# Patient Record
Sex: MALE | Race: BLACK OR AFRICAN AMERICAN | NOT HISPANIC OR LATINO | Employment: OTHER | URBAN - METROPOLITAN AREA
[De-identification: names, ages, dates, MRNs, and addresses within clinical notes are randomized per-mention and may not be internally consistent; named-entity substitution may affect disease eponyms.]

---

## 2020-11-29 ENCOUNTER — HOSPITAL ENCOUNTER (EMERGENCY)
Facility: HOSPITAL | Age: 70
Discharge: HOME | End: 2020-11-29
Attending: EMERGENCY MEDICINE
Payer: MEDICARE

## 2020-11-29 VITALS
WEIGHT: 176 LBS | TEMPERATURE: 98 F | DIASTOLIC BLOOD PRESSURE: 79 MMHG | SYSTOLIC BLOOD PRESSURE: 157 MMHG | RESPIRATION RATE: 18 BRPM | BODY MASS INDEX: 23.84 KG/M2 | OXYGEN SATURATION: 100 % | HEART RATE: 66 BPM | HEIGHT: 72 IN

## 2020-11-29 DIAGNOSIS — E04.1 THYROID NODULE: ICD-10-CM

## 2020-11-29 DIAGNOSIS — R09.A2 GLOBUS SENSATION: Primary | ICD-10-CM

## 2020-11-29 LAB
ALBUMIN SERPL BCP-MCNC: 3.6 G/DL (ref 3.5–5.2)
ALP SERPL-CCNC: 46 U/L (ref 55–135)
ALT SERPL W/O P-5'-P-CCNC: 32 U/L (ref 10–44)
ANION GAP SERPL CALC-SCNC: 11 MMOL/L (ref 8–16)
AST SERPL-CCNC: 24 U/L (ref 10–40)
BASOPHILS # BLD AUTO: 0.04 K/UL (ref 0–0.2)
BASOPHILS NFR BLD: 0.6 % (ref 0–1.9)
BILIRUB SERPL-MCNC: 0.3 MG/DL (ref 0.1–1)
BUN SERPL-MCNC: 14 MG/DL (ref 8–23)
CALCIUM SERPL-MCNC: 9 MG/DL (ref 8.7–10.5)
CHLORIDE SERPL-SCNC: 107 MMOL/L (ref 95–110)
CO2 SERPL-SCNC: 25 MMOL/L (ref 23–29)
CREAT SERPL-MCNC: 1 MG/DL (ref 0.5–1.4)
DIFFERENTIAL METHOD: ABNORMAL
EOSINOPHIL # BLD AUTO: 0.1 K/UL (ref 0–0.5)
EOSINOPHIL NFR BLD: 1.1 % (ref 0–8)
ERYTHROCYTE [DISTWIDTH] IN BLOOD BY AUTOMATED COUNT: 14 % (ref 11.5–14.5)
EST. GFR  (AFRICAN AMERICAN): >60 ML/MIN/1.73 M^2
EST. GFR  (NON AFRICAN AMERICAN): >60 ML/MIN/1.73 M^2
GLUCOSE SERPL-MCNC: 111 MG/DL (ref 70–110)
HCT VFR BLD AUTO: 32.7 % (ref 40–54)
HGB BLD-MCNC: 11.6 G/DL (ref 14–18)
IMM GRANULOCYTES # BLD AUTO: 0.03 K/UL (ref 0–0.04)
IMM GRANULOCYTES NFR BLD AUTO: 0.5 % (ref 0–0.5)
LYMPHOCYTES # BLD AUTO: 1.6 K/UL (ref 1–4.8)
LYMPHOCYTES NFR BLD: 24 % (ref 18–48)
MCH RBC QN AUTO: 29.9 PG (ref 27–31)
MCHC RBC AUTO-ENTMCNC: 35.5 G/DL (ref 32–36)
MCV RBC AUTO: 84 FL (ref 82–98)
MONOCYTES # BLD AUTO: 1 K/UL (ref 0.3–1)
MONOCYTES NFR BLD: 15.2 % (ref 4–15)
NEUTROPHILS # BLD AUTO: 3.9 K/UL (ref 1.8–7.7)
NEUTROPHILS NFR BLD: 58.6 % (ref 38–73)
NRBC BLD-RTO: 0 /100 WBC
PLATELET # BLD AUTO: 290 K/UL (ref 150–350)
PMV BLD AUTO: 9.6 FL (ref 9.2–12.9)
POTASSIUM SERPL-SCNC: 4.6 MMOL/L (ref 3.5–5.1)
PROT SERPL-MCNC: 6.3 G/DL (ref 6–8.4)
RBC # BLD AUTO: 3.88 M/UL (ref 4.6–6.2)
SODIUM SERPL-SCNC: 143 MMOL/L (ref 136–145)
WBC # BLD AUTO: 6.59 K/UL (ref 3.9–12.7)

## 2020-11-29 PROCEDURE — 80053 COMPREHEN METABOLIC PANEL: CPT

## 2020-11-29 PROCEDURE — 25500020 PHARM REV CODE 255: Performed by: EMERGENCY MEDICINE

## 2020-11-29 PROCEDURE — 96374 THER/PROPH/DIAG INJ IV PUSH: CPT | Mod: 59

## 2020-11-29 PROCEDURE — 85025 COMPLETE CBC W/AUTO DIFF WBC: CPT

## 2020-11-29 PROCEDURE — 99285 EMERGENCY DEPT VISIT HI MDM: CPT | Mod: 25

## 2020-11-29 PROCEDURE — 63600175 PHARM REV CODE 636 W HCPCS: Performed by: EMERGENCY MEDICINE

## 2020-11-29 RX ORDER — DIPHENHYDRAMINE HYDROCHLORIDE 50 MG/ML
50 INJECTION INTRAMUSCULAR; INTRAVENOUS
Status: DISCONTINUED | OUTPATIENT
Start: 2020-11-29 | End: 2020-11-29

## 2020-11-29 RX ORDER — DIPHENHYDRAMINE HYDROCHLORIDE 50 MG/ML
25 INJECTION INTRAMUSCULAR; INTRAVENOUS
Status: DISCONTINUED | OUTPATIENT
Start: 2020-11-29 | End: 2020-11-29 | Stop reason: HOSPADM

## 2020-11-29 RX ORDER — OMEPRAZOLE 20 MG/1
20 CAPSULE, DELAYED RELEASE ORAL DAILY
Qty: 30 CAPSULE | Refills: 0 | Status: SHIPPED | OUTPATIENT
Start: 2020-11-29 | End: 2020-12-29

## 2020-11-29 RX ADMIN — DIPHENHYDRAMINE HYDROCHLORIDE 25 MG: 50 INJECTION, SOLUTION INTRAMUSCULAR; INTRAVENOUS at 08:11

## 2020-11-29 RX ADMIN — PREDNISONE 50 MG: 10 TABLET ORAL at 08:11

## 2020-11-29 RX ADMIN — IOHEXOL 75 ML: 350 INJECTION, SOLUTION INTRAVENOUS at 08:11

## 2020-11-29 NOTE — ED PROVIDER NOTES
"Encounter Date: 11/29/2020    SCRIBE #1 NOTE: I, Christal Ruiz, am scribing for, and in the presence of,  Dr. Arreaga . I have scribed the entire note.       History     Chief Complaint   Patient presents with    Food Bolus     pt reports feeling like something in throat and difficulty swallowing that began today.  reports eating breakfast this AM and bread PTA.  tolerating secretions.  able to speak in full sentences without difficulty.       Patient is a 70 year-old male who presents to the Emergency Department today with complaints of food bolus. Patient reports since eating breakfast (eggs, sausage and biscuits) this morning at 11 AM he has felt like "something" has been stuck in his throat. The sensation has been constant. Pt denies any difficulty swallowing, tolerating his secretions,  SOB or chest pain. Pt attempted to relieve symptoms by drinking hot tea and water with no improvement. Pt denies any abdominal pain or back pain. Furthermore, he denies any previous history esophageal disorder.     The history is provided by the patient.     Review of patient's allergies indicates:   Allergen Reactions    Penicillins     Contrast media Hives and Itching     Past Medical History:   Diagnosis Date    Cancer     prostate    Diabetes mellitus     Hypertension      No past surgical history on file.  No family history on file.  Social History     Tobacco Use    Smoking status: Not on file   Substance Use Topics    Alcohol use: Not on file    Drug use: Not on file     Review of Systems   Constitutional: Negative for chills and fever.   HENT: Negative for sore throat and trouble swallowing.         + foreign body in throat    Respiratory: Negative for choking, shortness of breath, wheezing and stridor.    Cardiovascular: Negative for chest pain.   Gastrointestinal: Negative for constipation, diarrhea, nausea and vomiting.   Genitourinary: Negative for dysuria, frequency and urgency.   Musculoskeletal: " Negative for back pain.   Skin: Negative for rash and wound.   Neurological: Negative for weakness.   Hematological: Does not bruise/bleed easily.   Psychiatric/Behavioral: Negative for agitation, behavioral problems and confusion.       Physical Exam     Initial Vitals [11/29/20 1610]   BP Pulse Resp Temp SpO2   137/72 72 18 98 °F (36.7 °C) 99 %      MAP       --         Physical Exam    Nursing note and vitals reviewed.  Constitutional: He appears well-developed and well-nourished. He is not diaphoretic. No distress.   Well-appearing   No acute distress  Non toxic in appearance    HENT:   Head: Normocephalic and atraumatic.   Mouth/Throat: Oropharynx is clear and moist.   Eyes: EOM are normal. Pupils are equal, round, and reactive to light.   Neck: No tracheal deviation present.   Cardiovascular: Normal rate, regular rhythm, normal heart sounds and intact distal pulses.   Pulmonary/Chest: Breath sounds normal. No stridor. No respiratory distress. He has no wheezes. He has no rales.   Airway is patent   No stridor    Abdominal: Soft. He exhibits no distension and no mass. There is no abdominal tenderness.   Musculoskeletal: Normal range of motion. No edema.   Neurological: He is alert and oriented to person, place, and time. No cranial nerve deficit or sensory deficit.   Skin: Skin is warm and dry. Capillary refill takes less than 2 seconds. No rash noted.   Psychiatric: He has a normal mood and affect. His behavior is normal. Thought content normal.         ED Course   Procedures  Labs Reviewed   CBC W/ AUTO DIFFERENTIAL - Abnormal; Notable for the following components:       Result Value    RBC 3.88 (*)     Hemoglobin 11.6 (*)     Hematocrit 32.7 (*)     Mono % 15.2 (*)     All other components within normal limits   COMPREHENSIVE METABOLIC PANEL - Abnormal; Notable for the following components:    Glucose 111 (*)     Alkaline Phosphatase 46 (*)     All other components within normal limits          Imaging  Results           CT Soft Tissue Neck With Contrast (Final result)  Result time 11/29/20 21:12:04    Final result by Anselmo Jason MD (11/29/20 21:12:04)                 Impression:      No foreign body in the neck.  No abnormal air collections.    1.3 cm ill-defined right thyroid lobe nodule.  Outpatient follow-up with dedicated thyroid ultrasound is suggested.    This report was flagged in Epic as abnormal.      Electronically signed by: Anselmo Jason MD  Date:    11/29/2020  Time:    21:12             Narrative:    EXAMINATION:  CT SOFT TISSUE NECK WITH CONTRAST    CLINICAL HISTORY:  Foreign body ingestion;    TECHNIQUE:  Low dose axial images as well as sagittal and coronal reconstructions were performed from the skull base to the clavicles following the intravenous administration of 75 mL of Omnipaque 350.    COMPARISON:  Soft tissue x-ray of the neck dated 11/29/2020.    FINDINGS:  The visualized intracranial compartment is within normal limits.  There is no evidence of abnormal enhancement within the intracranial cavity.    The orbits and intraorbital contents are unremarkable.  The paranasal sinuses are unremarkable.  The mastoid air cells are clear.    The nasal cavity is within normal limits.  The oral cavity is within normal limits.  The oropharynx and nasopharynx are within normal limits.    The epiglottis is unremarkable.  The piriform sinuses are within normal limits.  The larynx is unremarkable.  The hypopharynx is unremarkable.  The esophagus is within normal limits.  No foreign body is identified within the esophagus.    The parotid glands are unremarkable.  The submandibular glands are within normal limits.  There is asymmetric enlargement of the right thyroid lobe with multiple nodules within the right thyroid lobe.  There is an ill-defined 1.3 cm nodule within the posterior aspect of the right thyroid lobe.    The trachea is unremarkable.  The visualized lung apices are within normal limits.  No  airspace opacity is identified.  There are no nodules identified.    There are multilevel degenerative changes in the cervical spine.  This is most significant at the C3-C4 level with suspected moderate narrowing the spinal canal.  There is no evidence of a fracture.    There is normal intravascular enhancement.                               X-Ray Neck Soft Tissue (Final result)  Result time 11/29/20 17:21:23    Final result by Kulwant Mills MD (11/29/20 17:21:23)                 Impression:      Spondylosis in the cervical spine.    No evidence of soft tissue gas swelling or foreign body in the region of the nasal, oral or hypopharynx or esophagus.      Electronically signed by: Kulwant Mills  Date:    11/29/2020  Time:    17:21             Narrative:    EXAMINATION:  XR NECK SOFT TISSUE    CLINICAL HISTORY:  Unspecified foreign body in esophagus causing other injury, initial encounter    TECHNIQUE:  AP and lateral soft tissue views the neck were performed.    COMPARISON:  None.    FINDINGS:  There is normal alignment of the cervical spine with mild spondylosis and disc space narrowing at C3-4.  There is no prevertebral soft tissue swelling or foreign body or gas.  Base of the tongue and airway appear unremarkable.                                 Medical Decision Making:   Independently Interpreted Test(s):   I have ordered and independently interpreted X-rays - see prior notes.  Clinical Tests:   Radiological Study: Ordered and Reviewed  ED Management:  - plain radiograph soft tissue neck unremarkable  - CT soft tissue neck negative for FB; esophagus is unremarkable; notable incidental finding of thyroid nodule  - discussed case with on-call GI who did not feel that pt required emergent intervention at this time  - discussed findings with patient who states he feels better; he is comfortable with plan for discharge at this time  - pt stable for discharge  - pt instructed to f/u with his OP for further  evaluation of thyroid nodule   - No further intervention is indicated at this time after having taken into account the patient's history, physical exam findings, and empirical and objective data obtained during the patient's emergency department workup.   - The patient is at low risk for an emergent medical condition at this time, and I am of the belief that that it is safe to discharge the patient from the emergency department.   - The patient is instructed to follow up as outpatient as indicated on the discharge paperwork.    - I have discussed the specifics of the workup with the patient and the patient has verbalized understanding of the details of the workup, the diagnosis, the treatment plan, and the need for outpatient follow-up.    - Although the patient has no emergent etiology today this does not preclude the development of an emergent condition so, in addition, I have advised the patient that they can return to the ED and/or activate EMS at any time with worsening of their symptoms, change of their symptoms, or with any other medical complaint.    - The patient remained comfortable and stable during their visit in the ED.    - Discharge and follow-up instructions discussed with the patient who expressed understanding and willingness to comply with my recommendations.  - Results of all emergency department tests  discussed thoroughly with patient; all patient questions answered; pt in agreement with plan  - Pt instructed to follow up with PCP in 2-3 days for recheck of today's complaints  - Pt given strict emergency department return precautions for any new or worsening of symptoms  - Pt discharged from the emergency department in stable condition, in no acute distress                     ED Course as of Dec 01 0254   Sun Nov 29, 2020 2125 Patient reports feeling better; patient does not currently describe a globus sensation when questioned.    [LC]   2131 Discussed case with on-call GI, Dr. Fisher, who  did not feel that the patient required emergent endoscopy at this time.     [LC]      ED Course User Index  [LC] Migel Arreaga MD            Clinical Impression:     ICD-10-CM ICD-9-CM   1. Globus sensation  R09.89 306.4   2. Thyroid nodule  E04.1 241.0                          ED Disposition Condition    Discharge Stable        ED Prescriptions     Medication Sig Dispense Start Date End Date Auth. Provider    omeprazole (PRILOSEC) 20 MG capsule Take 1 capsule (20 mg total) by mouth once daily. 30 capsule 11/29/2020 12/29/2020 Migel Arreaga MD        Follow-up Information     Follow up With Specialties Details Why Contact Info    Daniel Toscano MD Gastroenterology  Gastroenterology. Please schedule a follow up appointment. [address]  [address]  [place] [address]  [phone]      [facility]  If symptoms worsen [address] [address] [phone]                        I, Migel Arreaga,  personally performed the services described in this documentation. All medical record entries made by the scribe were at my direction and in my presence.  I have reviewed the chart and agree that the record reflects my personal performance and is accurate and complete. Migel Arreaga M.D. 2:54 AM12/01/2020                 Migel Arreaga MD  12/01/20 0254

## 2020-11-30 NOTE — ED NOTES
Pt requesting to talk with the doctor at this time. Pt reports he has questions about the diagnosis. md notified at this time.

## 2020-11-30 NOTE — DISCHARGE INSTRUCTIONS
Please follow up with your PCP in next 2-3 days for recheck of today's complaints.    Please return immediately for any new or worsening symptoms.

## 2020-11-30 NOTE — ED NOTES
upon carehandoff, pt vital signs are stable at this time. pt in no apparent distress. Pt reports feeling much better at this time. pt updated on results waiting at this time. pt verbalizes understanding. pt given call bell and instructed to call for assistance. pt verbalizes understanding and provides return demo. Will continue to monitor.

## 2020-11-30 NOTE — ED NOTES
Pt is in no apparent distress at this time. Pt verbalizes understanding of discharge and need to follow up with primary. Pt instructed to return to the ED if condition worsens. Pt verbalized understanding. Pt ambulatory out of unit, steady gait noted.       1/2 pack for 40 years/cigarettes

## 2020-11-30 NOTE — ED NOTES
Upon arrival to ct, pt reports that last time he received iv contrast he had an allergic reaction. Pt reports hives and itching. md reports to pre-treat pt prior to scan.

## 2021-07-14 ENCOUNTER — LAB VISIT (OUTPATIENT)
Dept: LAB | Facility: OTHER | Age: 71
End: 2021-07-14
Attending: INTERNAL MEDICINE
Payer: MEDICARE

## 2021-07-14 DIAGNOSIS — D64.9 ANEMIA, UNSPECIFIED: Primary | ICD-10-CM

## 2021-07-14 DIAGNOSIS — K92.2 ACUTE GASTROINTESTINAL HEMORRHAGE: ICD-10-CM

## 2021-07-14 LAB
BASOPHILS # BLD AUTO: 0.05 K/UL (ref 0–0.2)
BASOPHILS NFR BLD: 0.8 % (ref 0–1.9)
DIFFERENTIAL METHOD: ABNORMAL
EOSINOPHIL # BLD AUTO: 0 K/UL (ref 0–0.5)
EOSINOPHIL NFR BLD: 0.6 % (ref 0–8)
ERYTHROCYTE [DISTWIDTH] IN BLOOD BY AUTOMATED COUNT: 22.5 % (ref 11.5–14.5)
HCT VFR BLD AUTO: 32.9 % (ref 40–54)
HGB BLD-MCNC: 11.3 G/DL (ref 14–18)
IMM GRANULOCYTES # BLD AUTO: 0.02 K/UL (ref 0–0.04)
IMM GRANULOCYTES NFR BLD AUTO: 0.3 % (ref 0–0.5)
LYMPHOCYTES # BLD AUTO: 1.3 K/UL (ref 1–4.8)
LYMPHOCYTES NFR BLD: 20.3 % (ref 18–48)
MCH RBC QN AUTO: 24.8 PG (ref 27–31)
MCHC RBC AUTO-ENTMCNC: 34.3 G/DL (ref 32–36)
MCV RBC AUTO: 72 FL (ref 82–98)
MONOCYTES # BLD AUTO: 0.7 K/UL (ref 0.3–1)
MONOCYTES NFR BLD: 11.2 % (ref 4–15)
NEUTROPHILS # BLD AUTO: 4.1 K/UL (ref 1.8–7.7)
NEUTROPHILS NFR BLD: 66.8 % (ref 38–73)
NRBC BLD-RTO: 0 /100 WBC
PLATELET # BLD AUTO: 297 K/UL (ref 150–450)
PMV BLD AUTO: 8.6 FL (ref 9.2–12.9)
RBC # BLD AUTO: 4.55 M/UL (ref 4.6–6.2)
WBC # BLD AUTO: 6.17 K/UL (ref 3.9–12.7)

## 2021-07-14 PROCEDURE — 36415 COLL VENOUS BLD VENIPUNCTURE: CPT | Performed by: INTERNAL MEDICINE

## 2021-07-14 PROCEDURE — 85025 COMPLETE CBC W/AUTO DIFF WBC: CPT | Performed by: INTERNAL MEDICINE

## 2022-09-02 ENCOUNTER — HOSPITAL ENCOUNTER (EMERGENCY)
Facility: HOSPITAL | Age: 72
Discharge: HOME | End: 2022-09-02
Attending: EMERGENCY MEDICINE
Payer: MEDICARE

## 2022-09-02 VITALS
OXYGEN SATURATION: 100 % | TEMPERATURE: 99 F | HEIGHT: 72 IN | WEIGHT: 189 LBS | DIASTOLIC BLOOD PRESSURE: 93 MMHG | BODY MASS INDEX: 25.6 KG/M2 | HEART RATE: 67 BPM | SYSTOLIC BLOOD PRESSURE: 168 MMHG | RESPIRATION RATE: 18 BRPM

## 2022-09-02 DIAGNOSIS — R06.6 HICCUPS: Primary | ICD-10-CM

## 2022-09-02 DIAGNOSIS — R06.6 INTRACTABLE HICCUPS: ICD-10-CM

## 2022-09-02 LAB
ANION GAP SERPL CALC-SCNC: 8 MMOL/L (ref 8–16)
BASOPHILS # BLD AUTO: 0.05 K/UL (ref 0–0.2)
BASOPHILS NFR BLD: 0.8 % (ref 0–1.9)
BUN SERPL-MCNC: 16 MG/DL (ref 8–23)
CALCIUM SERPL-MCNC: 9.5 MG/DL (ref 8.7–10.5)
CHLORIDE SERPL-SCNC: 106 MMOL/L (ref 95–110)
CO2 SERPL-SCNC: 27 MMOL/L (ref 23–29)
CREAT SERPL-MCNC: 1.3 MG/DL (ref 0.5–1.4)
D DIMER PPP IA.FEU-MCNC: <0.19 MG/L FEU
DIFFERENTIAL METHOD: ABNORMAL
EOSINOPHIL # BLD AUTO: 0.1 K/UL (ref 0–0.5)
EOSINOPHIL NFR BLD: 1.9 % (ref 0–8)
ERYTHROCYTE [DISTWIDTH] IN BLOOD BY AUTOMATED COUNT: 13.1 % (ref 11.5–14.5)
EST. GFR  (NO RACE VARIABLE): 59 ML/MIN/1.73 M^2
GLUCOSE SERPL-MCNC: 116 MG/DL (ref 70–110)
HCT VFR BLD AUTO: 37.5 % (ref 40–54)
HGB BLD-MCNC: 13.6 G/DL (ref 14–18)
IMM GRANULOCYTES # BLD AUTO: 0.03 K/UL (ref 0–0.04)
IMM GRANULOCYTES NFR BLD AUTO: 0.5 % (ref 0–0.5)
LIPASE SERPL-CCNC: 85 U/L (ref 4–60)
LYMPHOCYTES # BLD AUTO: 1.1 K/UL (ref 1–4.8)
LYMPHOCYTES NFR BLD: 17.2 % (ref 18–48)
MCH RBC QN AUTO: 29.5 PG (ref 27–31)
MCHC RBC AUTO-ENTMCNC: 36.3 G/DL (ref 32–36)
MCV RBC AUTO: 81 FL (ref 82–98)
MONOCYTES # BLD AUTO: 1 K/UL (ref 0.3–1)
MONOCYTES NFR BLD: 15.3 % (ref 4–15)
NEUTROPHILS # BLD AUTO: 4.1 K/UL (ref 1.8–7.7)
NEUTROPHILS NFR BLD: 64.3 % (ref 38–73)
NRBC BLD-RTO: 0 /100 WBC
PLATELET # BLD AUTO: 242 K/UL (ref 150–450)
PMV BLD AUTO: 8.9 FL (ref 9.2–12.9)
POTASSIUM SERPL-SCNC: 4.9 MMOL/L (ref 3.5–5.1)
RBC # BLD AUTO: 4.61 M/UL (ref 4.6–6.2)
SODIUM SERPL-SCNC: 141 MMOL/L (ref 136–145)
TROPONIN I SERPL DL<=0.01 NG/ML-MCNC: <0.006 NG/ML (ref 0–0.03)
WBC # BLD AUTO: 6.33 K/UL (ref 3.9–12.7)

## 2022-09-02 PROCEDURE — 93010 EKG 12-LEAD: ICD-10-PCS | Mod: ,,, | Performed by: INTERNAL MEDICINE

## 2022-09-02 PROCEDURE — 96374 THER/PROPH/DIAG INJ IV PUSH: CPT

## 2022-09-02 PROCEDURE — 85025 COMPLETE CBC W/AUTO DIFF WBC: CPT | Performed by: PHYSICIAN ASSISTANT

## 2022-09-02 PROCEDURE — 83690 ASSAY OF LIPASE: CPT | Performed by: PHYSICIAN ASSISTANT

## 2022-09-02 PROCEDURE — 63600175 PHARM REV CODE 636 W HCPCS: Performed by: PHYSICIAN ASSISTANT

## 2022-09-02 PROCEDURE — 96361 HYDRATE IV INFUSION ADD-ON: CPT

## 2022-09-02 PROCEDURE — 93005 ELECTROCARDIOGRAM TRACING: CPT

## 2022-09-02 PROCEDURE — 93010 ELECTROCARDIOGRAM REPORT: CPT | Mod: ,,, | Performed by: INTERNAL MEDICINE

## 2022-09-02 PROCEDURE — 99285 EMERGENCY DEPT VISIT HI MDM: CPT | Mod: 25

## 2022-09-02 PROCEDURE — 85379 FIBRIN DEGRADATION QUANT: CPT | Performed by: EMERGENCY MEDICINE

## 2022-09-02 PROCEDURE — 25000003 PHARM REV CODE 250: Performed by: PHYSICIAN ASSISTANT

## 2022-09-02 PROCEDURE — 80048 BASIC METABOLIC PNL TOTAL CA: CPT | Performed by: PHYSICIAN ASSISTANT

## 2022-09-02 PROCEDURE — 84484 ASSAY OF TROPONIN QUANT: CPT | Performed by: EMERGENCY MEDICINE

## 2022-09-02 RX ORDER — METOCLOPRAMIDE HYDROCHLORIDE 5 MG/ML
10 INJECTION INTRAMUSCULAR; INTRAVENOUS
Status: COMPLETED | OUTPATIENT
Start: 2022-09-02 | End: 2022-09-02

## 2022-09-02 RX ORDER — CHLORPROMAZINE HYDROCHLORIDE 25 MG/1
25 TABLET, FILM COATED ORAL ONCE AS NEEDED
Qty: 4 TABLET | Refills: 0 | Status: SHIPPED | OUTPATIENT
Start: 2022-09-02 | End: 2022-09-02

## 2022-09-02 RX ORDER — LIDOCAINE HYDROCHLORIDE 20 MG/ML
10 SOLUTION OROPHARYNGEAL
Status: COMPLETED | OUTPATIENT
Start: 2022-09-02 | End: 2022-09-02

## 2022-09-02 RX ORDER — MAG HYDROX/ALUMINUM HYD/SIMETH 200-200-20
15 SUSPENSION, ORAL (FINAL DOSE FORM) ORAL
Status: COMPLETED | OUTPATIENT
Start: 2022-09-02 | End: 2022-09-02

## 2022-09-02 RX ADMIN — LIDOCAINE HYDROCHLORIDE 10 ML: 20 SOLUTION ORAL; TOPICAL at 05:09

## 2022-09-02 RX ADMIN — ALUMINUM HYDROXIDE, MAGNESIUM HYDROXIDE, AND DIMETHICONE 15 ML: 200; 20; 200 SUSPENSION ORAL at 05:09

## 2022-09-02 RX ADMIN — METOCLOPRAMIDE 10 MG: 5 INJECTION, SOLUTION INTRAMUSCULAR; INTRAVENOUS at 05:09

## 2022-09-02 NOTE — ED PROVIDER NOTES
SCRIBE #1 NOTE: IMaite, am scribing for, and in the presence of,  Amalia Weathers MD. I have scribed the following portions of the note - Other sections scribed: HPI, ROS, PE.         EM PHYSICIAN NOTE    HPI  This patient presents with a complaint of   Chief Complaint   Patient presents with    Hiccups     Pt c/o of hiccups x3 days. Pt stated they are constant and will not go away. Pt denied chest pain or SOB. Pt denied NVD.       HPI: Artie Mei is a 72 y.o. male, with a PMHx of DM, HTN, Cardiac Stents and long-term anticoagulant use, who presents to the ED with complaints of hiccups that have persisted for 3 days. Patient not hiccupping on exam but reports he was hiccupping for 1 hour on arrival but then stopped. Reports frequency of hiccups are every 20-30 seconds. Endorses sleep disturbances secondary to symptoms. Reports use of cough drops, but otherwise denies other forms of attempted Tx. Patient reports 1 similar episode but states it was years ago and resolved after visit to ED. Further reports CBG levels have been within range. Last bowel movement earlier today. No other modifying factors. Patient denies chest pain, SOB, N/V/D, fever, leg swelling, unexplained weight loss, or other associated symptoms.       REVIEW of PMH, SOC History and Family History:  Past Medical History:   Diagnosis Date    Cancer     prostate    Diabetes mellitus     Hypertension      Social history noncontributory  Family history noncontributory    Review of patient's allergies indicates:   Allergen Reactions    Penicillins     Contrast media Hives and Itching           REVIEW of SYSTEMS  Source: Patient  The nurse's notes and triage vital signs were reviewed.  GENERAL/CONSTITUTIONAL: There is no report of fever, fatigue, weakness, or unexplained weight loss.  CARDIOVASCULAR: There is no report of chest pain   RESPIRATORY: There is no report of cough or SOB  GASTROINTESTINAL: SEE HPI  MUSCULOSKELETAL: There is  no report of joint or muscle pain. No muscle weakness or tenderness.  SKIN AND BREASTS: There is no report of easy bruising, skin redness, skin rash.  HEMATOLOGIC/LYMPHATIC: SEE HPI  The remainder of the ROS is negative.        PHYSICAL EXAMINATION    ED Triage Vitals [09/02/22 1433]   Enc Vitals Group      /71      Pulse 71      Resp 18      Temp 98.1 °F (36.7 °C)      Temp src Oral      SpO2 100 %      Weight 189 lb      Height 6'      Head Circumference       Peak Flow       Pain Score       Pain Loc       Pain Edu?       Excl. in GC?      Vital signs and Pulse Ox reviewed in clinical context. Abnormalities noted: none:  Heart rate, blood pressure, temperature, respiratory rate and pulse ox are wnl  Pt's level of consciousness is alert, and the patient is in no distress.  Skin: warm, pink and dry. Capillary refill is less than 2 seconds.  Mucosa: moist  Head and Neck: WNL  Cardiac exam: RRR  Pulmonary exam: unlabored and clear  Abd Exam: soft nontender   Musculoskeletal: no joint tenderness, deformity or swelling   Neurologic: GCS: GCS 15; 5 over 5 strength, cranial nerves intact, neck supple       Initial Impression: hiccups  Plan: labs, xray, ekg  Micelle SASHA Weathers MD, 5:17 PM 9/2/2022      Medical decision making:   Nurses notes and Vital Signs reviewed.  Orders Placed This Encounter   Procedures    X-Ray Chest AP Portable    Basic metabolic panel    CBC Auto Differential    Lipase    Troponin I    D dimer, quantitative    Ambulatory referral/consult to Gastroenterology    EKG 12-lead       ED Course as of 09/02/22 2114   Fri Sep 02, 2022   1838 My independent interpretation of the EKG is normal sinus rhythm at a rate of 70.  There are flipped T-waves in the inferior and low lateral leads.  No ST segment elevation.  No old EKG in our system for comparison.  Epic everywhere shows that the patient has a history of an EKG that was recorded as nonspecific ST T wave abnormalities. []   1838 CMP is normal  [MH]   1839 CBC is normal [MH]   1839 Lipase is mildly elevated but not significant for pancreatitis [MH]   1839 Troponin is normal D-dimer is normal [MH]   2058 No recurrence of the hiccups [MH]      ED Course User Index  [] Amalia Weathers MD       Diagnoses that have been ruled out:   None   Diagnoses that are still under consideration:   None   Final diagnoses:   Intractable hiccups   Hiccups            Follow-up Information       Follow up With Specialties Details Why Contact Info    [facility]  In 2 days For Follow-up and Recheck Visit [email or web address] to schedule an appointment or have a same day ONLINE checkup.    Call for appointment  In 3 days For Follow-up and Recheck Call for Primary Care Clinic follow-up this week.  [phone]          ED Prescriptions       Medication Sig Dispense Start Date End Date Auth. Provider    chlorproMAZINE (THORAZINE) 25 MG tablet (Expires today) Take 1 tablet (25 mg total) by mouth once as needed (hiccups). 4 tablet 9/2/2022 9/2/2022 Amalia Weathers MD            This note was created using Dictation Software.  This program may occasionally misinterpret certain words and phrases.        Scribe Attestation:   Scribe #1: I performed the above scribed service and the documentation accurately describes the services I performed. I attest to the accuracy of the note.     SCRIBE ATTESTATION NOTE:   I attest that I personally performed the services documented by the scribe and acknowledged and confirm the content of the note.   Nurses notes were reviewed.  Amalia Weathers      Nurses notes were reviewed.      CRITICAL CARE TIME:  These services included the following: chart data review, reviewing nursing notes and researching old charts from internal and external sources, documentation time, consultant collaboration regarding findings and treatment options, medication orders and management, direct patient care, vital sign assessments, physical exam  reassessments, and ordering, interpreting and reviewing diagnostic studies/lab tests.    Aggregate critical care time was approximately 10 minutes, which includes only time during which I was engaged in work directly related to the patient's care, as described above, whether at the bedside or elsewhere in the Emergency Department.  It did not include time spent performing other reported procedures or the services of residents, students, nurses or physician assistants.         ED Disposition Condition    Discharge Stable                            Micelle SASHA Weathers MD  09/02/22 7366

## 2022-09-02 NOTE — ED NOTES
Patient reports infrequent hiccups that is currently not experiencing right now. Denies any n/v/d, SOB, or pain.

## 2022-09-03 NOTE — ED NOTES
Received report from  AJAY Webber. Introduced self at bedside, pt is resting comfortably, denies any pain at this time. Reports that hiccups are completely gone and havent returned in a while. Pt is alert, calm, and oriented x4, no acute distress noted.

## 2023-01-13 ENCOUNTER — HOSPITAL ENCOUNTER (OUTPATIENT)
Facility: HOSPITAL | Age: 73
Discharge: HOME | End: 2023-01-14
Attending: STUDENT IN AN ORGANIZED HEALTH CARE EDUCATION/TRAINING PROGRAM | Admitting: STUDENT IN AN ORGANIZED HEALTH CARE EDUCATION/TRAINING PROGRAM
Payer: MEDICARE

## 2023-01-13 DIAGNOSIS — Z95.5 HISTORY OF CORONARY ANGIOPLASTY WITH INSERTION OF STENT: Primary | ICD-10-CM

## 2023-01-13 DIAGNOSIS — R07.9 CHEST PAIN: ICD-10-CM

## 2023-01-13 DIAGNOSIS — E11.9 TYPE 2 DIABETES MELLITUS WITHOUT COMPLICATION, WITHOUT LONG-TERM CURRENT USE OF INSULIN: ICD-10-CM

## 2023-01-13 PROBLEM — E87.5 HYPERKALEMIA: Status: ACTIVE | Noted: 2023-01-13

## 2023-01-13 PROBLEM — E78.5 HLD (HYPERLIPIDEMIA): Status: ACTIVE | Noted: 2023-01-13

## 2023-01-13 PROBLEM — I10 HTN (HYPERTENSION): Status: ACTIVE | Noted: 2023-01-13

## 2023-01-13 PROBLEM — I25.10 CAD (CORONARY ARTERY DISEASE): Status: ACTIVE | Noted: 2023-01-13

## 2023-01-13 LAB
ALBUMIN SERPL BCP-MCNC: 4.1 G/DL (ref 3.5–5.2)
ALP SERPL-CCNC: 52 U/L (ref 55–135)
ALT SERPL W/O P-5'-P-CCNC: 37 U/L (ref 10–44)
ANION GAP SERPL CALC-SCNC: 8 MMOL/L (ref 8–16)
AST SERPL-CCNC: 30 U/L (ref 10–40)
BASOPHILS # BLD AUTO: 0.05 K/UL (ref 0–0.2)
BASOPHILS NFR BLD: 0.7 % (ref 0–1.9)
BILIRUB SERPL-MCNC: 0.3 MG/DL (ref 0.1–1)
BNP SERPL-MCNC: 23 PG/ML (ref 0–99)
BUN SERPL-MCNC: 21 MG/DL (ref 8–23)
CALCIUM SERPL-MCNC: 9.7 MG/DL (ref 8.7–10.5)
CHLORIDE SERPL-SCNC: 107 MMOL/L (ref 95–110)
CHOLEST SERPL-MCNC: 167 MG/DL (ref 120–199)
CHOLEST/HDLC SERPL: 2.2 {RATIO} (ref 2–5)
CO2 SERPL-SCNC: 24 MMOL/L (ref 23–29)
CREAT SERPL-MCNC: 1.3 MG/DL (ref 0.5–1.4)
DIFFERENTIAL METHOD: ABNORMAL
EOSINOPHIL # BLD AUTO: 0.1 K/UL (ref 0–0.5)
EOSINOPHIL NFR BLD: 1.6 % (ref 0–8)
ERYTHROCYTE [DISTWIDTH] IN BLOOD BY AUTOMATED COUNT: 13.6 % (ref 11.5–14.5)
EST. GFR  (NO RACE VARIABLE): 59 ML/MIN/1.73 M^2
GLUCOSE SERPL-MCNC: 119 MG/DL (ref 70–110)
HCT VFR BLD AUTO: 38.2 % (ref 40–54)
HDLC SERPL-MCNC: 76 MG/DL (ref 40–75)
HDLC SERPL: 45.5 % (ref 20–50)
HGB BLD-MCNC: 13.7 G/DL (ref 14–18)
IMM GRANULOCYTES # BLD AUTO: 0.03 K/UL (ref 0–0.04)
IMM GRANULOCYTES NFR BLD AUTO: 0.4 % (ref 0–0.5)
LDLC SERPL CALC-MCNC: 80.2 MG/DL (ref 63–159)
LYMPHOCYTES # BLD AUTO: 1.2 K/UL (ref 1–4.8)
LYMPHOCYTES NFR BLD: 15.9 % (ref 18–48)
MCH RBC QN AUTO: 29.7 PG (ref 27–31)
MCHC RBC AUTO-ENTMCNC: 35.9 G/DL (ref 32–36)
MCV RBC AUTO: 83 FL (ref 82–98)
MONOCYTES # BLD AUTO: 0.8 K/UL (ref 0.3–1)
MONOCYTES NFR BLD: 11.2 % (ref 4–15)
NEUTROPHILS # BLD AUTO: 5.1 K/UL (ref 1.8–7.7)
NEUTROPHILS NFR BLD: 70.2 % (ref 38–73)
NONHDLC SERPL-MCNC: 91 MG/DL
NRBC BLD-RTO: 0 /100 WBC
PLATELET # BLD AUTO: 263 K/UL (ref 150–450)
PMV BLD AUTO: 8.8 FL (ref 9.2–12.9)
POTASSIUM SERPL-SCNC: 5.3 MMOL/L (ref 3.5–5.1)
PROT SERPL-MCNC: 7.2 G/DL (ref 6–8.4)
RBC # BLD AUTO: 4.61 M/UL (ref 4.6–6.2)
SODIUM SERPL-SCNC: 139 MMOL/L (ref 136–145)
TRIGL SERPL-MCNC: 54 MG/DL (ref 30–150)
TROPONIN I SERPL DL<=0.01 NG/ML-MCNC: 0.01 NG/ML (ref 0–0.03)
TROPONIN I SERPL DL<=0.01 NG/ML-MCNC: <0.006 NG/ML (ref 0–0.03)
TSH SERPL DL<=0.005 MIU/L-ACNC: 0.47 UIU/ML (ref 0.4–4)
WBC # BLD AUTO: 7.31 K/UL (ref 3.9–12.7)

## 2023-01-13 PROCEDURE — G0378 HOSPITAL OBSERVATION PER HR: HCPCS

## 2023-01-13 PROCEDURE — 85025 COMPLETE CBC W/AUTO DIFF WBC: CPT | Performed by: STUDENT IN AN ORGANIZED HEALTH CARE EDUCATION/TRAINING PROGRAM

## 2023-01-13 PROCEDURE — 96360 HYDRATION IV INFUSION INIT: CPT

## 2023-01-13 PROCEDURE — 25000242 PHARM REV CODE 250 ALT 637 W/ HCPCS: Performed by: STUDENT IN AN ORGANIZED HEALTH CARE EDUCATION/TRAINING PROGRAM

## 2023-01-13 PROCEDURE — 63600175 PHARM REV CODE 636 W HCPCS

## 2023-01-13 PROCEDURE — 25000003 PHARM REV CODE 250

## 2023-01-13 PROCEDURE — 80053 COMPREHEN METABOLIC PANEL: CPT | Performed by: STUDENT IN AN ORGANIZED HEALTH CARE EDUCATION/TRAINING PROGRAM

## 2023-01-13 PROCEDURE — 80061 LIPID PANEL: CPT

## 2023-01-13 PROCEDURE — 83880 ASSAY OF NATRIURETIC PEPTIDE: CPT | Performed by: STUDENT IN AN ORGANIZED HEALTH CARE EDUCATION/TRAINING PROGRAM

## 2023-01-13 PROCEDURE — 93010 EKG 12-LEAD: ICD-10-PCS | Mod: ,,, | Performed by: INTERNAL MEDICINE

## 2023-01-13 PROCEDURE — 25000003 PHARM REV CODE 250: Performed by: STUDENT IN AN ORGANIZED HEALTH CARE EDUCATION/TRAINING PROGRAM

## 2023-01-13 PROCEDURE — 99285 EMERGENCY DEPT VISIT HI MDM: CPT | Mod: 25

## 2023-01-13 PROCEDURE — 84484 ASSAY OF TROPONIN QUANT: CPT | Performed by: STUDENT IN AN ORGANIZED HEALTH CARE EDUCATION/TRAINING PROGRAM

## 2023-01-13 PROCEDURE — 93010 ELECTROCARDIOGRAM REPORT: CPT | Mod: ,,, | Performed by: INTERNAL MEDICINE

## 2023-01-13 PROCEDURE — 84443 ASSAY THYROID STIM HORMONE: CPT

## 2023-01-13 PROCEDURE — 83036 HEMOGLOBIN GLYCOSYLATED A1C: CPT

## 2023-01-13 PROCEDURE — 96372 THER/PROPH/DIAG INJ SC/IM: CPT

## 2023-01-13 PROCEDURE — 93005 ELECTROCARDIOGRAM TRACING: CPT

## 2023-01-13 RX ORDER — NITROGLYCERIN 0.4 MG/1
0.4 TABLET SUBLINGUAL EVERY 5 MIN PRN
Status: DISCONTINUED | OUTPATIENT
Start: 2023-01-13 | End: 2023-01-13

## 2023-01-13 RX ORDER — NITROGLYCERIN 0.4 MG/1
0.4 TABLET SUBLINGUAL EVERY 5 MIN PRN
Status: DISCONTINUED | OUTPATIENT
Start: 2023-01-13 | End: 2023-01-14 | Stop reason: HOSPADM

## 2023-01-13 RX ORDER — POLYETHYLENE GLYCOL 3350 17 G/17G
17 POWDER, FOR SOLUTION ORAL DAILY
Status: DISCONTINUED | OUTPATIENT
Start: 2023-01-14 | End: 2023-01-14 | Stop reason: HOSPADM

## 2023-01-13 RX ORDER — NALOXONE HCL 0.4 MG/ML
0.02 VIAL (ML) INJECTION
Status: DISCONTINUED | OUTPATIENT
Start: 2023-01-13 | End: 2023-01-14 | Stop reason: HOSPADM

## 2023-01-13 RX ORDER — AMLODIPINE BESYLATE 5 MG/1
10 TABLET ORAL DAILY
Status: DISCONTINUED | OUTPATIENT
Start: 2023-01-14 | End: 2023-01-14 | Stop reason: HOSPADM

## 2023-01-13 RX ORDER — HYDRALAZINE HYDROCHLORIDE 20 MG/ML
10 INJECTION INTRAMUSCULAR; INTRAVENOUS EVERY 6 HOURS PRN
Status: DISCONTINUED | OUTPATIENT
Start: 2023-01-13 | End: 2023-01-14 | Stop reason: HOSPADM

## 2023-01-13 RX ORDER — GLUCAGON 1 MG
1 KIT INJECTION
Status: DISCONTINUED | OUTPATIENT
Start: 2023-01-13 | End: 2023-01-14 | Stop reason: HOSPADM

## 2023-01-13 RX ORDER — IBUPROFEN 200 MG
24 TABLET ORAL
Status: DISCONTINUED | OUTPATIENT
Start: 2023-01-13 | End: 2023-01-14 | Stop reason: HOSPADM

## 2023-01-13 RX ORDER — FLUOXETINE HYDROCHLORIDE 20 MG/1
20 CAPSULE ORAL DAILY
COMMUNITY

## 2023-01-13 RX ORDER — MAG HYDROX/ALUMINUM HYD/SIMETH 200-200-20
30 SUSPENSION, ORAL (FINAL DOSE FORM) ORAL 4 TIMES DAILY PRN
Status: DISCONTINUED | OUTPATIENT
Start: 2023-01-13 | End: 2023-01-14 | Stop reason: HOSPADM

## 2023-01-13 RX ORDER — RANOLAZINE 500 MG/1
500 TABLET, EXTENDED RELEASE ORAL 2 TIMES DAILY
COMMUNITY

## 2023-01-13 RX ORDER — LEVOCETIRIZINE DIHYDROCHLORIDE 5 MG/1
5 TABLET, FILM COATED ORAL NIGHTLY
COMMUNITY

## 2023-01-13 RX ORDER — AMLODIPINE AND BENAZEPRIL HYDROCHLORIDE 10; 20 MG/1; MG/1
1 CAPSULE ORAL DAILY
COMMUNITY

## 2023-01-13 RX ORDER — CARVEDILOL 12.5 MG/1
12.5 TABLET ORAL 2 TIMES DAILY WITH MEALS
COMMUNITY

## 2023-01-13 RX ORDER — IPRATROPIUM BROMIDE AND ALBUTEROL SULFATE 2.5; .5 MG/3ML; MG/3ML
3 SOLUTION RESPIRATORY (INHALATION) EVERY 4 HOURS PRN
Status: DISCONTINUED | OUTPATIENT
Start: 2023-01-13 | End: 2023-01-14 | Stop reason: HOSPADM

## 2023-01-13 RX ORDER — MEMANTINE HYDROCHLORIDE 5 MG/1
5 TABLET ORAL 2 TIMES DAILY
Status: DISCONTINUED | OUTPATIENT
Start: 2023-01-13 | End: 2023-01-14 | Stop reason: HOSPADM

## 2023-01-13 RX ORDER — ENOXAPARIN SODIUM 100 MG/ML
40 INJECTION SUBCUTANEOUS EVERY 24 HOURS
Status: DISCONTINUED | OUTPATIENT
Start: 2023-01-13 | End: 2023-01-14 | Stop reason: HOSPADM

## 2023-01-13 RX ORDER — RANOLAZINE 500 MG/1
500 TABLET, EXTENDED RELEASE ORAL 2 TIMES DAILY
Status: DISCONTINUED | OUTPATIENT
Start: 2023-01-13 | End: 2023-01-14 | Stop reason: HOSPADM

## 2023-01-13 RX ORDER — TALC
6 POWDER (GRAM) TOPICAL NIGHTLY PRN
Status: DISCONTINUED | OUTPATIENT
Start: 2023-01-13 | End: 2023-01-14 | Stop reason: HOSPADM

## 2023-01-13 RX ORDER — ATORVASTATIN CALCIUM 10 MG/1
20 TABLET, FILM COATED ORAL DAILY
Status: DISCONTINUED | OUTPATIENT
Start: 2023-01-14 | End: 2023-01-14 | Stop reason: HOSPADM

## 2023-01-13 RX ORDER — FLUOXETINE HYDROCHLORIDE 20 MG/1
20 CAPSULE ORAL DAILY
Status: DISCONTINUED | OUTPATIENT
Start: 2023-01-14 | End: 2023-01-14 | Stop reason: HOSPADM

## 2023-01-13 RX ORDER — NAPROXEN SODIUM 220 MG/1
81 TABLET, FILM COATED ORAL DAILY
Status: DISCONTINUED | OUTPATIENT
Start: 2023-01-14 | End: 2023-01-14 | Stop reason: HOSPADM

## 2023-01-13 RX ORDER — ONDANSETRON 8 MG/1
8 TABLET, ORALLY DISINTEGRATING ORAL EVERY 8 HOURS PRN
Status: DISCONTINUED | OUTPATIENT
Start: 2023-01-13 | End: 2023-01-14 | Stop reason: HOSPADM

## 2023-01-13 RX ORDER — CARVEDILOL 12.5 MG/1
12.5 TABLET ORAL 2 TIMES DAILY WITH MEALS
Status: DISCONTINUED | OUTPATIENT
Start: 2023-01-14 | End: 2023-01-14 | Stop reason: HOSPADM

## 2023-01-13 RX ORDER — PROCHLORPERAZINE EDISYLATE 5 MG/ML
5 INJECTION INTRAMUSCULAR; INTRAVENOUS EVERY 6 HOURS PRN
Status: DISCONTINUED | OUTPATIENT
Start: 2023-01-13 | End: 2023-01-14 | Stop reason: HOSPADM

## 2023-01-13 RX ORDER — ACETAMINOPHEN 325 MG/1
650 TABLET ORAL EVERY 4 HOURS PRN
Status: DISCONTINUED | OUTPATIENT
Start: 2023-01-13 | End: 2023-01-14 | Stop reason: HOSPADM

## 2023-01-13 RX ORDER — ASPIRIN 325 MG
325 TABLET ORAL
Status: COMPLETED | OUTPATIENT
Start: 2023-01-13 | End: 2023-01-13

## 2023-01-13 RX ORDER — ATORVASTATIN CALCIUM 20 MG/1
20 TABLET, FILM COATED ORAL DAILY
COMMUNITY

## 2023-01-13 RX ORDER — MEMANTINE HYDROCHLORIDE 5 MG/1
5 TABLET ORAL 2 TIMES DAILY
COMMUNITY

## 2023-01-13 RX ORDER — INSULIN ASPART 100 [IU]/ML
0-5 INJECTION, SOLUTION INTRAVENOUS; SUBCUTANEOUS
Status: DISCONTINUED | OUTPATIENT
Start: 2023-01-13 | End: 2023-01-14 | Stop reason: HOSPADM

## 2023-01-13 RX ORDER — IBUPROFEN 200 MG
16 TABLET ORAL
Status: DISCONTINUED | OUTPATIENT
Start: 2023-01-13 | End: 2023-01-14 | Stop reason: HOSPADM

## 2023-01-13 RX ADMIN — RANOLAZINE 500 MG: 500 TABLET, FILM COATED, EXTENDED RELEASE ORAL at 11:01

## 2023-01-13 RX ADMIN — SODIUM CHLORIDE 500 ML: 9 INJECTION, SOLUTION INTRAVENOUS at 11:01

## 2023-01-13 RX ADMIN — MEMANTINE 5 MG: 5 TABLET ORAL at 11:01

## 2023-01-13 RX ADMIN — ENOXAPARIN SODIUM 40 MG: 40 INJECTION SUBCUTANEOUS at 11:01

## 2023-01-13 RX ADMIN — NITROGLYCERIN 0.4 MG: 0.4 TABLET, ORALLY DISINTEGRATING SUBLINGUAL at 06:01

## 2023-01-13 RX ADMIN — ASPIRIN 325 MG ORAL TABLET 325 MG: 325 PILL ORAL at 05:01

## 2023-01-13 NOTE — ED PROVIDER NOTES
"Encounter Date: 1/13/2023       History     Chief Complaint   Patient presents with    Chest Pain     Pt to ED with complaints of non radiating mid sternal chest pain that is constant. Pt states it started approx 1 hr PTA. Denies medications PTA. States the pain feels like "grabbing." Reports cardiac hx. Denies SOB.      72-year-old male with history of CAD (s/p 7 stents), HTN and NIDDM presents with chest pain that began an hour prior to arrival.  Patient reports he was at the gym exercising when symptoms began in described as pressure-like and substernal.  Pain does not radiate in any direction and the onset was rated 4/10.  Denies any nausea or diaphoresis.  He reports resting with minimal improvement of pain to 2/10 so came to the emergency department for evaluation.  Denies any shortness of breath or lower extremity swelling.  Denies any cough or recent sick contacts.      Review of patient's allergies indicates:   Allergen Reactions    Penicillins     Contrast media Hives and Itching     Past Medical History:   Diagnosis Date    Cancer     prostate    Diabetes mellitus     Hypertension      History reviewed. No pertinent surgical history.  History reviewed. No pertinent family history.  Social History     Tobacco Use    Smoking status: Never    Smokeless tobacco: Never     Review of Systems   Constitutional:  Negative for chills and fever.   HENT:  Negative for congestion and rhinorrhea.    Eyes:  Negative for pain.   Respiratory:  Negative for cough and shortness of breath.    Cardiovascular:  Positive for chest pain. Negative for leg swelling.   Gastrointestinal:  Negative for abdominal pain, nausea and vomiting.   Endocrine: Negative for polyuria.   Genitourinary:  Negative for dysuria and hematuria.   Musculoskeletal:  Negative for gait problem and neck pain.   Skin:  Negative for rash.   Allergic/Immunologic: Negative for immunocompromised state.   Neurological:  Negative for dizziness, weakness and " headaches.     Physical Exam     Initial Vitals [01/13/23 1722]   BP Pulse Resp Temp SpO2   134/66 67 16 97.6 °F (36.4 °C) 98 %      MAP       --         Physical Exam    Constitutional: He appears well-developed and well-nourished. He is not diaphoretic. No distress.   HENT:   Head: Normocephalic and atraumatic.   Eyes: Conjunctivae and EOM are normal. Pupils are equal, round, and reactive to light.   Neck: No JVD present.   Normal range of motion.  Cardiovascular:  Normal rate and regular rhythm.           Pulses:       Radial pulses are 2+ on the right side and 2+ on the left side.        Posterior tibial pulses are 2+ on the right side and 2+ on the left side.   Pulmonary/Chest: Breath sounds normal. No respiratory distress.   Abdominal: Abdomen is soft. Bowel sounds are normal. He exhibits no distension. There is no abdominal tenderness. There is no rebound and no guarding.   Musculoskeletal:         General: No tenderness or edema. Normal range of motion.      Cervical back: Normal range of motion.     Lymphadenopathy:     He has no cervical adenopathy.   Neurological: He is alert and oriented to person, place, and time.   Skin: Skin is warm. Capillary refill takes less than 2 seconds.   Psychiatric: He has a normal mood and affect.       ED Course   Procedures  Labs Reviewed   CBC W/ AUTO DIFFERENTIAL - Abnormal; Notable for the following components:       Result Value    Hemoglobin 13.7 (*)     Hematocrit 38.2 (*)     MPV 8.8 (*)     Lymph % 15.9 (*)     All other components within normal limits   COMPREHENSIVE METABOLIC PANEL - Abnormal; Notable for the following components:    Potassium 5.3 (*)     Glucose 119 (*)     Alkaline Phosphatase 52 (*)     eGFR 59 (*)     All other components within normal limits   TROPONIN I   TROPONIN I   B-TYPE NATRIURETIC PEPTIDE   TSH   LIPID PANEL   TSH   TROPONIN I     EKG Readings: (Independently Interpreted)   EKG obtained at 5:19 p.m. sinus rhythm with a ventricular  rate of 70, no STEMI, nonspecific T-wave abnormalities.  Intervals within normal limits.     Imaging Results              X-Ray Chest AP Portable (Final result)  Result time 01/13/23 18:50:58      Final result by Thai Mchugh MD (01/13/23 18:50:58)                   Impression:      No acute cardiopulmonary process identified.      Electronically signed by: Thai Mchugh MD  Date:    01/13/2023  Time:    18:50               Narrative:    EXAMINATION:  XR CHEST AP PORTABLE    CLINICAL HISTORY:  Chest Pain;    TECHNIQUE:  Single frontal view of the chest was performed.    COMPARISON:  September 2022.    FINDINGS:  Cardiac silhouette is normal in size.  Lungs are symmetrically expanded.  No evidence of focal consolidative process, pneumothorax, or significant pleural effusion.  No acute osseous abnormality identified.                                       Medications   nitroGLYCERIN SL tablet 0.4 mg (0.4 mg Sublingual Given 1/13/23 1800)   atorvastatin tablet 20 mg (has no administration in time range)   carvediloL tablet 12.5 mg (has no administration in time range)   FLUoxetine capsule 20 mg (has no administration in time range)   memantine tablet 5 mg (has no administration in time range)   ranolazine 12 hr tablet 500 mg (has no administration in time range)   amLODIPine tablet 10 mg (has no administration in time range)   albuterol-ipratropium 2.5 mg-0.5 mg/3 mL nebulizer solution 3 mL (has no administration in time range)   melatonin tablet 6 mg (has no administration in time range)   ondansetron disintegrating tablet 8 mg (has no administration in time range)   prochlorperazine injection Soln 5 mg (has no administration in time range)   polyethylene glycol packet 17 g (has no administration in time range)   aluminum-magnesium hydroxide-simethicone 200-200-20 mg/5 mL suspension 30 mL (has no administration in time range)   acetaminophen tablet 650 mg (has no administration in time range)   naloxone 0.4 mg/mL  injection 0.02 mg (has no administration in time range)   enoxaparin injection 40 mg (has no administration in time range)   hydrALAZINE injection 10 mg (has no administration in time range)   aspirin tablet 325 mg (325 mg Oral Given 1/13/23 1748)     Medical Decision Making:   Initial Assessment:   72 year old patient with hx of CAD (s/p 7 stents), NIDDM, and HTN presenting secondary to chest pain. No evidence of volume overload or shock on exam. EKG without evidence of STEMI. Patient is at higher risk of ACS due to risk factors and HPI with a heart score of 7.  Patient has received an aspirin. Troponins, Cxr, and labs ordered. Patient received Nitro for pain.     Also considered but less likely:     PE: normal rate, no sob/recent immobilization/surgery/travel/family history  Pneumonia: chest xray negative. No fever or leukocytosis. No cough and lungs non consistent with pna  Tamponade: unlikely due to chest xray and ekg  STEMI: No STEMI on ekg  Dissection: equal pulses bilaterally and no ripping chest pain to the back  Esophageal rupture: no dysphagia or vomiting and chest xray negative for mediastinal air  Arrhythmia: no arrhythmia on ekg  CHF: no fluid overload on Cxr and physical exam  Pneumothorax: bilateral breath sounds and no signs of pneumothorax on chest xray       Clinical Tests:   Lab Tests: Reviewed and Ordered  Radiological Study: Reviewed and Ordered  Medical Tests: Reviewed and Ordered  Additional MDM:   Heart Score:    History:          Highly suspicious.  ECG:             Nonspecific repolarisation disturbance  Age:               >65 years  Risk factors: >= 3 risk factors or history of atherosclerotic disease  Troponin:       Less than or equal to normal limit  Final Score: 7          ED Course as of 01/13/23 2208 Fri Jan 13, 2023   1830 CBC without significant leukocytosis, anemia, or platelet abnormalities.    [AS]   1853 K of 5.3 EKG without any changes Chem 14 negative for hypo-or hyper  natremia, chloridemia, or other electrolyte abnormalities; BUN and creatinine were within normal limits indicating normal kidney function, ALT and AST were within normal limits indicating normal liver function. Trop and BNP wnl    [AS]   2105 Repeat troponin of 0.01.  Given patient's heart score of 6 will admit patient in observation for cardiac workup. [AS]   2115 Discussed case with observation provider who agrees with admission for cardiac workup.  Spoke with patient about plan and is in agreement. [AS]      ED Course User Index  [AS] Yohana Jaime MD            This dictation has been generated using M-Modal Fluency Direct dictation; some phonetic errors may occur.          Clinical Impression:   Final diagnoses:  [R07.9] Chest pain        ED Disposition Condition    Observation                 Yohana Jaime MD  01/13/23 1038

## 2023-01-14 VITALS
TEMPERATURE: 98 F | WEIGHT: 185.63 LBS | RESPIRATION RATE: 18 BRPM | SYSTOLIC BLOOD PRESSURE: 143 MMHG | BODY MASS INDEX: 25.14 KG/M2 | DIASTOLIC BLOOD PRESSURE: 75 MMHG | OXYGEN SATURATION: 97 % | HEART RATE: 64 BPM | HEIGHT: 72 IN

## 2023-01-14 LAB
ANION GAP SERPL CALC-SCNC: 7 MMOL/L (ref 8–16)
AORTIC ROOT ANNULUS: 2.97 CM
AORTIC VALVE CUSP SEPERATION: 2.41 CM
ASCENDING AORTA: 2.99 CM
AV INDEX (PROSTH): 0.64
AV MEAN GRADIENT: 4 MMHG
AV PEAK GRADIENT: 8 MMHG
AV VALVE AREA: 2.13 CM2
AV VELOCITY RATIO: 0.71
BSA FOR ECHO PROCEDURE: 2.08 M2
BUN SERPL-MCNC: 16 MG/DL (ref 8–23)
CALCIUM SERPL-MCNC: 9.3 MG/DL (ref 8.7–10.5)
CHLORIDE SERPL-SCNC: 110 MMOL/L (ref 95–110)
CO2 SERPL-SCNC: 23 MMOL/L (ref 23–29)
CREAT SERPL-MCNC: 1.1 MG/DL (ref 0.5–1.4)
CV ECHO LV RWT: 0.45 CM
DOP CALC AO PEAK VEL: 1.39 M/S
DOP CALC AO VTI: 34.2 CM
DOP CALC LVOT AREA: 3.3 CM2
DOP CALC LVOT DIAMETER: 2.06 CM
DOP CALC LVOT PEAK VEL: 0.98 M/S
DOP CALC LVOT STROKE VOLUME: 72.95 CM3
DOP CALCLVOT PEAK VEL VTI: 21.9 CM
E WAVE DECELERATION TIME: 248.36 MSEC
E/A RATIO: 0.79
E/E' RATIO: 6.67 M/S
ECHO LV POSTERIOR WALL: 1.1 CM (ref 0.6–1.1)
EJECTION FRACTION: 70 %
EST. GFR  (NO RACE VARIABLE): >60 ML/MIN/1.73 M^2
ESTIMATED AVG GLUCOSE: 128 MG/DL (ref 68–131)
FRACTIONAL SHORTENING: 30 % (ref 28–44)
GLUCOSE SERPL-MCNC: 105 MG/DL (ref 70–110)
HBA1C MFR BLD: 6.1 % (ref 4–5.6)
INTERVENTRICULAR SEPTUM: 1.65 CM (ref 0.6–1.1)
IVC DIAMETER: 0.92 CM
LA MAJOR: 5.11 CM
LA MINOR: 5.35 CM
LA WIDTH: 3.9 CM
LEFT ATRIUM SIZE: 3.32 CM
LEFT ATRIUM VOLUME INDEX: 27.9 ML/M2
LEFT ATRIUM VOLUME: 57.53 CM3
LEFT INTERNAL DIMENSION IN SYSTOLE: 3.42 CM (ref 2.1–4)
LEFT VENTRICLE DIASTOLIC VOLUME INDEX: 54.66 ML/M2
LEFT VENTRICLE DIASTOLIC VOLUME: 112.6 ML
LEFT VENTRICLE MASS INDEX: 134 G/M2
LEFT VENTRICLE SYSTOLIC VOLUME INDEX: 23.3 ML/M2
LEFT VENTRICLE SYSTOLIC VOLUME: 48.08 ML
LEFT VENTRICULAR INTERNAL DIMENSION IN DIASTOLE: 4.9 CM (ref 3.5–6)
LEFT VENTRICULAR MASS: 275.2 G
LV LATERAL E/E' RATIO: 5.83 M/S
LV SEPTAL E/E' RATIO: 7.78 M/S
LVOT MG: 2.39 MMHG
LVOT MV: 0.74 CM/S
MV PEAK A VEL: 0.89 M/S
MV PEAK E VEL: 0.7 M/S
MV STENOSIS PRESSURE HALF TIME: 69.65 MS
MV VALVE AREA P 1/2 METHOD: 3.16 CM2
PISA TR MAX VEL: 2.18 M/S
POCT GLUCOSE: 118 MG/DL (ref 70–110)
POCT GLUCOSE: 94 MG/DL (ref 70–110)
POTASSIUM SERPL-SCNC: 3.9 MMOL/L (ref 3.5–5.1)
PULM VEIN S/D RATIO: 1.9
PV PEAK D VEL: 0.29 M/S
PV PEAK S VEL: 0.55 M/S
PV PEAK VELOCITY: 0.93 CM/S
RA MAJOR: 4.56 CM
RA PRESSURE: 3 MMHG
RA WIDTH: 3.6 CM
RIGHT VENTRICULAR END-DIASTOLIC DIMENSION: 3.02 CM
SINUS: 3.37 CM
SODIUM SERPL-SCNC: 140 MMOL/L (ref 136–145)
STJ: 2.75 CM
TDI LATERAL: 0.12 M/S
TDI SEPTAL: 0.09 M/S
TDI: 0.11 M/S
TR MAX PG: 19 MMHG
TRICUSPID ANNULAR PLANE SYSTOLIC EXCURSION: 2.4 CM
TROPONIN I SERPL DL<=0.01 NG/ML-MCNC: <0.006 NG/ML (ref 0–0.03)
TROPONIN I SERPL DL<=0.01 NG/ML-MCNC: <0.006 NG/ML (ref 0–0.03)
TV REST PULMONARY ARTERY PRESSURE: 22 MMHG

## 2023-01-14 PROCEDURE — 80048 BASIC METABOLIC PNL TOTAL CA: CPT

## 2023-01-14 PROCEDURE — G0378 HOSPITAL OBSERVATION PER HR: HCPCS

## 2023-01-14 PROCEDURE — 84484 ASSAY OF TROPONIN QUANT: CPT

## 2023-01-14 PROCEDURE — 84484 ASSAY OF TROPONIN QUANT: CPT | Mod: 91

## 2023-01-14 PROCEDURE — 25000003 PHARM REV CODE 250

## 2023-01-14 RX ORDER — NITROGLYCERIN 0.4 MG/1
0.4 TABLET SUBLINGUAL EVERY 5 MIN PRN
Qty: 20 TABLET | Refills: 11 | Status: SHIPPED | OUTPATIENT
Start: 2023-01-14

## 2023-01-14 RX ADMIN — ASPIRIN 81 MG CHEWABLE TABLET 81 MG: 81 TABLET CHEWABLE at 08:01

## 2023-01-14 RX ADMIN — ATORVASTATIN CALCIUM 20 MG: 10 TABLET, FILM COATED ORAL at 08:01

## 2023-01-14 RX ADMIN — MEMANTINE 5 MG: 5 TABLET ORAL at 08:01

## 2023-01-14 RX ADMIN — FLUOXETINE 20 MG: 20 CAPSULE ORAL at 08:01

## 2023-01-14 RX ADMIN — CARVEDILOL 12.5 MG: 12.5 TABLET, FILM COATED ORAL at 07:01

## 2023-01-14 RX ADMIN — RANOLAZINE 500 MG: 500 TABLET, FILM COATED, EXTENDED RELEASE ORAL at 08:01

## 2023-01-14 RX ADMIN — AMLODIPINE BESYLATE 10 MG: 5 TABLET ORAL at 08:01

## 2023-01-14 NOTE — PLAN OF CARE
Nurse, Gemini notified via secure message that He is clear for DC from a CM standpoint    [facility] - Med Surg  Discharge Final Note    Primary Care Provider: Primary Doctor No    Expected Discharge Date: 1/14/2023    Final Discharge Note (most recent)       Final Note - 01/14/23 1211          Final Note    Assessment Type Final Discharge Note     Anticipated Discharge Disposition Home or Self Care     Hospital Resources/Appts/Education Provided --   Patient will have self schedule due to weekend DC                    Important Message from Medicare             Contact Info       No, Primary Doctor   Relationship: PCP - General        Next Steps: Follow up    Kp Kwong MD   Specialty: Internal Medicine    [address] [address]   Phone: [phone]       Next Steps: Schedule an appointment as soon as possible for a visit in 1 week(s)    Instructions: for Hospital Follow up

## 2023-01-14 NOTE — H&P
"[facility]  History & Physical    Patient Name: Rafael Mei  MRN: [id number]  Patient Class: OP- Observation  Admission Date: 1/13/2023  Attending Physician: Xavi Kasper MD   Primary Care Provider: Primary Doctor No         Patient information was obtained from patient, past medical records and ER records.     Subjective:     Principal Problem:Chest pain    Chief Complaint:   Chief Complaint   Patient presents with    Chest Pain     Pt to ED with complaints of non radiating mid sternal chest pain that is constant. Pt states it started approx 1 hr PTA. Denies medications PTA. States the pain feels like "grabbing." Reports cardiac hx. Denies SOB.         HPI: Rafael Mei 72 y.o. male with HTN, HLD, DM, CAD (with 7 stents) hospital with a chief complaint of chest pain, acute onset while working out at the gym, the patient stated that he wrote about 3 miles on a stationary bicycle, did squats, and then started running on the treadmill when he began having substernal pressure-like pain that did not radiate that he rated 4/10.  He then sat down and rested for about 15 minutes without pain relief, he denies any lightheadedness diaphoresis, nausea, shortness of breath, facial droop, slurred speech, or other associated problems.  Patient was given 2 doses of sublingual nitroglycerin in the ED with complete relief of symptoms.      In the ED patient was afebrile without leukocytosis, potassium 5.3, EGFR 59, glucose 119, alkaline phosphatase 52, HDL 76, BNP and troponin negative, CXR negative, EKG showed "Normal sinus rhythm Possible Anterior infarct ,age undetermined" without changes from previous, patient placed in OBS for ACS rule out.  TRANSTHORACIC ECHOCARDIOGRAM REPORT   Name:   RAFAEL         MRN#:        [id number]   Study Date:    3/30/2021 SUMMARY:    1. Hyperdynamic left ventricular systolic function. LVEF of 65 to 70%.    2. Mildly elevated pulmonary artery pressure.    3. " Normal right ventricular systolic function.    4. Normal central venous pressure.    5. Dilated aortic root (4.0 cm).       Past Medical History:   Diagnosis Date    Cancer     prostate    Diabetes mellitus     Hypertension        History reviewed. No pertinent surgical history.    Review of patient's allergies indicates:   Allergen Reactions    Penicillins     Contrast media Hives and Itching       No current facility-administered medications on file prior to encounter.     Current Outpatient Medications on File Prior to Encounter   Medication Sig    amlodipine-benazepril 10-20mg (LOTREL) 10-20 mg per capsule Take 1 capsule by mouth once daily.    atorvastatin (LIPITOR) 20 MG tablet Take 20 mg by mouth once daily.    carvediloL (COREG) 12.5 MG tablet Take 12.5 mg by mouth 2 (two) times daily with meals.    FLUoxetine 20 MG capsule Take 20 mg by mouth once daily.    levocetirizine (XYZAL) 5 MG tablet Take 5 mg by mouth every evening.    memantine (NAMENDA) 5 MG Tab Take 5 mg by mouth 2 (two) times daily.    ranolazine (RANEXA) 500 MG Tb12 Take 500 mg by mouth 2 (two) times daily.    chlorproMAZINE (THORAZINE) 25 MG tablet Take 1 tablet (25 mg total) by mouth once as needed (hiccups).    omeprazole (PRILOSEC) 20 MG capsule Take 1 capsule (20 mg total) by mouth once daily.     Family History    None       Tobacco Use    Smoking status: Never    Smokeless tobacco: Never   Substance and Sexual Activity    Alcohol use: Not on file    Drug use: Not on file    Sexual activity: Not on file     Review of Systems   Constitutional:  Negative for chills and fever.   HENT:  Negative for congestion and rhinorrhea.    Eyes:  Negative for photophobia and visual disturbance.   Respiratory:  Positive for chest tightness. Negative for cough and shortness of breath.    Cardiovascular:  Positive for chest pain. Negative for palpitations and leg swelling.   Gastrointestinal:  Negative for abdominal pain, diarrhea,  nausea and vomiting.   Genitourinary:  Negative for frequency, hematuria and urgency.   Skin:  Negative for pallor, rash and wound.   Neurological:  Negative for light-headedness and headaches.   Psychiatric/Behavioral:  Negative for confusion and decreased concentration.    Objective:     Vital Signs (Most Recent):  Temp: 98.5 °F (36.9 °C) (01/13/23 2132)  Pulse: 63 (01/13/23 2132)  Resp: 17 (01/13/23 2132)  BP: (!) 164/81 (01/13/23 2132)  SpO2: 100 % (01/13/23 2132)   Vital Signs (24h Range):  Temp:  [97.6 °F (36.4 °C)-98.5 °F (36.9 °C)] 98.5 °F (36.9 °C)  Pulse:  [58-68] 63  Resp:  [12-17] 17  SpO2:  [96 %-100 %] 100 %  BP: (110-177)/(56-89) 164/81     Weight: 84.8 kg (187 lb)  Body mass index is 25.36 kg/m².    Physical Exam  Vitals and nursing note reviewed.   Constitutional:       General: He is not in acute distress.     Appearance: He is well-developed.   HENT:      Head: Normocephalic and atraumatic.      Right Ear: External ear normal.      Left Ear: External ear normal.      Nose: Nose normal.   Eyes:      Conjunctiva/sclera: Conjunctivae normal.      Pupils: Pupils are equal, round, and reactive to light.   Cardiovascular:      Rate and Rhythm: Normal rate and regular rhythm.      Pulses: Normal pulses.      Heart sounds: No murmur heard.  Pulmonary:      Effort: Pulmonary effort is normal. No respiratory distress.      Breath sounds: Normal breath sounds. No wheezing or rales.   Abdominal:      General: Bowel sounds are normal. There is no distension.      Palpations: Abdomen is soft.      Tenderness: There is no abdominal tenderness.      Comments: No palpable hepatomegaly or splenomegaly   Musculoskeletal:         General: No tenderness. Normal range of motion.      Cervical back: Normal range of motion and neck supple.   Skin:     General: Skin is warm and dry.   Neurological:      Mental Status: He is alert and oriented to person, place, and time.   Psychiatric:         Thought Content: Thought  content normal.         CRANIAL NERVES     CN III, IV, VI   Pupils are equal, round, and reactive to light.     Significant Labs: All pertinent labs within the past 24 hours have been reviewed.  Recent Lab Results         01/13/23 2025 01/13/23  1743        Albumin   4.1       Alkaline Phosphatase   52       ALT   37       Anion Gap   8       AST   30       Baso #   0.05       Basophil %   0.7       BILIRUBIN TOTAL   0.3  Comment: For infants and newborns, interpretation of results should be based  on gestational age, weight and in agreement with clinical  observations.    Premature Infant recommended reference ranges:  Up to 24 hours.............<8.0 mg/dL  Up to 48 hours............<12.0 mg/dL  3-5 days..................<15.0 mg/dL  6-29 days.................<15.0 mg/dL         BNP   23  Comment: Values of less than 100 pg/ml are consistent with non-CHF populations.       BUN   21       Calcium   9.7       Chloride   107       Cholesterol 167  Comment: The National Cholesterol Education Program (NCEP) has set the  following guidelines (reference ranges) for Cholesterol:  Optimal.....................<200 mg/dL  Borderline High.............200-239 mg/dL  High........................> or = 240 mg/dL           CO2   24       Creatinine   1.3       Differential Method   Automated       eGFR   59       Eos #   0.1       Eosinophil %   1.6       Glucose   119       Gran # (ANC)   5.1       Gran %   70.2       HDL 76  Comment: The National Cholesterol Education Program (NCEP) has set the  following guidelines (reference values) for HDL Cholesterol:  Low...............<40 mg/dL  Optimal...........>60 mg/dL           HDL/Cholesterol Ratio 45.5         Hematocrit   38.2       Hemoglobin   13.7       Immature Grans (Abs)   0.03  Comment: Mild elevation in immature granulocytes is non specific and   can be seen in a variety of conditions including stress response,   acute inflammation, trauma and pregnancy. Correlation with  other   laboratory and clinical findings is essential.         Immature Granulocytes   0.4       LDL Cholesterol External 80.2  Comment: The National Cholesterol Education Program (NCEP) has set the  following guidelines (reference values) for LDL Cholesterol:  Optimal.......................<130 mg/dL  Borderline High...............130-159 mg/dL  High..........................160-189 mg/dL  Very High.....................>190 mg/dL           Lymph #   1.2       Lymph %   15.9       MCH   29.7       MCHC   35.9       MCV   83       Mono #   0.8       Mono %   11.2       MPV   8.8       Non-HDL Cholesterol 91  Comment: Risk category and Non-HDL cholesterol goals:  Coronary heart disease (CHD)or equivalent (10-year risk of CHD >20%):  Non-HDL cholesterol goal     <130 mg/dL  Two or more CHD risk factors and 10-year risk of CHD <= 20%:  Non-HDL cholesterol goal     <160 mg/dL  0 to 1 CHD risk factor:  Non-HDL cholesterol goal     <190 mg/dL           nRBC   0       Platelets   263       Potassium   5.3       PROTEIN TOTAL   7.2       RBC   4.61       RDW   13.6       Sodium   139       Total Cholesterol/HDL Ratio 2.2         Triglycerides 54  Comment: The National Cholesterol Education Program (NCEP) has set the  following guidelines (reference values) for triglycerides:  Normal......................<150 mg/dL  Borderline High.............150-199 mg/dL  High........................200-499 mg/dL           Troponin I 0.010  Comment: The reference interval for Troponin I represents the 99th percentile   cutoff   for our facility and is consistent with 3rd generation assay   performance.     <0.006  Comment: The reference interval for Troponin I represents the 99th percentile   cutoff   for our facility and is consistent with 3rd generation assay   performance.         WBC   7.31               Significant Imaging: I have reviewed all pertinent imaging results/findings within the past 24 hours.  Imaging Results               X-Ray Chest AP Portable (Final result)  Result time 01/13/23 18:50:58      Final result by Thai Mchugh MD (01/13/23 18:50:58)                   Impression:      No acute cardiopulmonary process identified.      Electronically signed by: Thai Mchugh MD  Date:    01/13/2023  Time:    18:50               Narrative:    EXAMINATION:  XR CHEST AP PORTABLE    CLINICAL HISTORY:  Chest Pain;    TECHNIQUE:  Single frontal view of the chest was performed.    COMPARISON:  September 2022.    FINDINGS:  Cardiac silhouette is normal in size.  Lungs are symmetrically expanded.  No evidence of focal consolidative process, pneumothorax, or significant pleural effusion.  No acute osseous abnormality identified.                                        Assessment/Plan:     * Chest pain  Substernal chest pain while working out at the gym, did not resolve with rest, significant heart history with 7 stents placed most recent echo as above, CXR negative, EKG NSR with possible age undetermined anterior infarct without changes from previous KELLY score 5.  cardiac monitoring  trend troponins, initial troponins negative  ASA and PRN NTG  2D echo pending  TSH and lipid panel    Hyperkalemia  Initial potassium 5.3, presently no EKG changes, no chest pain, no shortness of breath, continue treatments as above  IVS   AM BMP if still elevated consider 1 dose Lokelma    CAD (coronary artery disease)  Currently placed in OBS for ACS rule out, per chart review patient has history of 7 stents, most recent MISTY as above.    Type 2 diabetes mellitus without complication, without long-term current use of insulin    Antihyperglycemics (From admission, onward)    Start     Stop Route Frequency Ordered    01/13/23 2343  insulin aspart U-100 pen 0-5 Units         -- SubQ Before meals & nightly PRN 01/13/23 2243        Hold Oral hypoglycemics while patient is in the hospital.  Low SS, goal 140-180  Will Check A1C    HLD (hyperlipidemia)  Continue  statin    HTN (hypertension)  BP not well controlled in ED, continue home antihypertensives with p.r.n. hydralazine for SBP greater than 180      VTE Risk Mitigation (From admission, onward)         Ordered     enoxaparin injection 40 mg  Daily         01/13/23 2129     IP VTE HIGH RISK PATIENT  Once         01/13/23 2129     Place sequential compression device  Until discontinued         01/13/23 2129               As clarification, on 01/13/2023, patient should be admitted for hospital observation services under my care in collaboration with Xavi Kasper MD. Chi Mc, TEODORO Mc NP  Department of Hospital Medicine   [facility]

## 2023-01-14 NOTE — ASSESSMENT & PLAN NOTE
Initial potassium 5.3, presently no EKG changes, no chest pain, no shortness of breath, continue treatments as above  IVS   AM BMP if still elevated consider 1 dose Lokelma

## 2023-01-14 NOTE — NURSING
Patient arrived to floor via transporter tech from ED. Patient transferred to bed independently. AAOX4. Patient was oriented to room, information on communication board, and medication regimen. Bed low adequate lighting provided, side rails x2 up, call bell in reach. Admission assessment completed. Tele monitor on and monitor tech notified. Vitals per chart. Patient denied having any acute distress at this time. None observed.      [facility]  Nurses Note -- 4 Eyes      1/13/2023       Skin assessed on: Admit      [x] No Pressure Injuries Present    []Prevention Measures Documented    [] Yes LDA  for Pressure Injury Previously documented     [] Yes New Pressure Injury Discovered   [] LDA for New Pressure Injury Added      Attending RN:  Brea Petersen LPN     Second RN:  Clarissa CHAHAL

## 2023-01-14 NOTE — DISCHARGE INSTRUCTIONS
Sublingual Nitroglycerin for Chest Pain  Place 1 under tongue if you have Chest Pain every 5 minutes until pain resolves  if you take 3 and pain has not resolved, please come to the Emergency Room.  Follow up with PCP and Cardiology       Clive Desai MD  Internal Medicine Staff

## 2023-01-14 NOTE — PLAN OF CARE
[place] [facility]  Discharge Assessment    Primary Care Provider: Primary Doctor No     Discharge Assessment (most recent)       BRIEF DISCHARGE ASSESSMENT - 01/14/23 1207          Discharge Planning    Assessment Type Discharge Planning Brief Assessment     Resource/Environmental Concerns none     Support Systems Family members     Equipment Currently Used at Home none     Current Living Arrangements home     Patient/Family Anticipates Transition to home     Patient/Family Anticipated Services at Transition none     DME Needed Upon Discharge  none     Discharge Plan A Home

## 2023-01-14 NOTE — ASSESSMENT & PLAN NOTE
BP not well controlled in ED, continue home antihypertensives with p.r.n. hydralazine for SBP greater than 180

## 2023-01-14 NOTE — ASSESSMENT & PLAN NOTE
Substernal chest pain while working out at the gym, did not resolve with rest, significant heart history with 7 stents placed most recent echo as above, CXR negative, EKG NSR with possible age undetermined anterior infarct without changes from previous KELLY score 5.  cardiac monitoring  trend troponins, initial troponins negative  ASA and PRN NTG  2D echo pending  TSH and lipid panel

## 2023-01-14 NOTE — DISCHARGE SUMMARY
"[facility] Medicine  Discharge Summary      Patient Name: Rafael Mei  MRN: [id number]  GAYLE: [id number]  Patient Class: OP- Observation  Admission Date: 1/13/2023  Hospital Length of Stay: 0 days  Discharge Date and Time:  01/14/2023 9:25 AM  Attending Physician: Clive Desai MD   Discharging Provider: Clive Desai MD  Primary Care Provider: Primary Doctor No    Primary Care Team: Networked reference to record PCT     HPI:   Rafael Mei 72 y.o. male with HTN, HLD, DM, CAD (with 7 stents) hospital with a chief complaint of chest pain, acute onset while working out at the gym, the patient stated that he wrote about 3 miles on a stationary bicycle, did squats, and then started running on the treadmill when he began having substernal pressure-like pain that did not radiate that he rated 4/10.  He then sat down and rested for about 15 minutes without pain relief, he denies any lightheadedness diaphoresis, nausea, shortness of breath, facial droop, slurred speech, or other associated problems.  Patient was given 2 doses of sublingual nitroglycerin in the ED with complete relief of symptoms.      In the ED patient was afebrile without leukocytosis, potassium 5.3, EGFR 59, glucose 119, alkaline phosphatase 52, HDL 76, BNP and troponin negative, CXR negative, EKG showed "Normal sinus rhythm Possible Anterior infarct ,age undetermined" without changes from previous, patient placed in OBS for ACS rule out.  TRANSTHORACIC ECHOCARDIOGRAM REPORT   Name:   RAFAEL         MRN#:        [id number]   Study Date:    3/30/2021 SUMMARY:    1. Hyperdynamic left ventricular systolic function. LVEF of 65 to 70%.    2. Mildly elevated pulmonary artery pressure.    3. Normal right ventricular systolic function.    4. Normal central venous pressure.    5. Dilated aortic root (4.0 cm).       * No surgery found *      Hospital Course:   Trop trend all negative, CP resolved.  Nitroglycerin SL sent to his pharmacy with " instructions.  Red Flags given to return.   F/u PCP and Cards       Sublingual Nitroglycerin for Chest Pain  o Place 1 under tongue if you have Chest Pain every 5 minutes until pain resolves  o if you take 3 and pain has not resolved, please come to the Emergency Room.   Follow up with PCP and Cardiology       Clive Desai MD  Internal Medicine Staff    Goals of Care Treatment Preferences:  Code Status: Full Code      Consults:     No new Assessment & Plan notes have been filed under this hospital service since the last note was generated.  Service: Hospital Medicine    Final Active Diagnoses:    Diagnosis Date Noted POA    PRINCIPAL PROBLEM:  Chest pain [R07.9] 01/13/2023 Yes    HTN (hypertension) [I10] 01/13/2023 Yes    HLD (hyperlipidemia) [E78.5] 01/13/2023 Yes    Type 2 diabetes mellitus without complication, without long-term current use of insulin [E11.9] 01/13/2023 Yes    CAD (coronary artery disease) [I25.10] 01/13/2023 Yes    Hyperkalemia [E87.5] 01/13/2023 Yes      Problems Resolved During this Admission:       Discharged Condition: good    Disposition: home    Follow Up:    Patient Instructions:      Ambulatory referral/consult to Cardiology   Standing Status: Future   Referral Priority: Routine Referral Type: Consultation   Referral Reason: Specialty Services Required   Requested Specialty: Cardiology   Number of Visits Requested: 1     Ambulatory referral/consult to Internal Medicine   Standing Status: Future   Referral Priority: Routine Referral Type: Consultation   Referral Reason: Specialty Services Required   Requested Specialty: Internal Medicine   Number of Visits Requested: 1         Recent Results (from the past 100 hour(s))   CBC auto differential    Collection Time: 01/13/23  5:43 PM   Result Value Ref Range    WBC 7.31 3.90 - 12.70 K/uL    RBC 4.61 4.60 - 6.20 M/uL    Hemoglobin 13.7 (L) 14.0 - 18.0 g/dL    Hematocrit 38.2 (L) 40.0 - 54.0 %    MCV 83 82 - 98 fL    MCH 29.7 27.0 -  31.0 pg    MCHC 35.9 32.0 - 36.0 g/dL    RDW 13.6 11.5 - 14.5 %    Platelets 263 150 - 450 K/uL    MPV 8.8 (L) 9.2 - 12.9 fL    Immature Granulocytes 0.4 0.0 - 0.5 %    Gran # (ANC) 5.1 1.8 - 7.7 K/uL    Immature Grans (Abs) 0.03 0.00 - 0.04 K/uL    Lymph # 1.2 1.0 - 4.8 K/uL    Mono # 0.8 0.3 - 1.0 K/uL    Eos # 0.1 0.0 - 0.5 K/uL    Baso # 0.05 0.00 - 0.20 K/uL    nRBC 0 0 /100 WBC    Gran % 70.2 38.0 - 73.0 %    Lymph % 15.9 (L) 18.0 - 48.0 %    Mono % 11.2 4.0 - 15.0 %    Eosinophil % 1.6 0.0 - 8.0 %    Basophil % 0.7 0.0 - 1.9 %    Differential Method Automated    Comprehensive metabolic panel    Collection Time: 01/13/23  5:43 PM   Result Value Ref Range    Sodium 139 136 - 145 mmol/L    Potassium 5.3 (H) 3.5 - 5.1 mmol/L    Chloride 107 95 - 110 mmol/L    CO2 24 23 - 29 mmol/L    Glucose 119 (H) 70 - 110 mg/dL    BUN 21 8 - 23 mg/dL    Creatinine 1.3 0.5 - 1.4 mg/dL    Calcium 9.7 8.7 - 10.5 mg/dL    Total Protein 7.2 6.0 - 8.4 g/dL    Albumin 4.1 3.5 - 5.2 g/dL    Total Bilirubin 0.3 0.1 - 1.0 mg/dL    Alkaline Phosphatase 52 (L) 55 - 135 U/L    AST 30 10 - 40 U/L    ALT 37 10 - 44 U/L    Anion Gap 8 8 - 16 mmol/L    eGFR 59 (A) >60 mL/min/1.73 m^2   Troponin I #1    Collection Time: 01/13/23  5:43 PM   Result Value Ref Range    Troponin I <0.006 0.000 - 0.026 ng/mL   B-Type natriuretic peptide (BNP)    Collection Time: 01/13/23  5:43 PM   Result Value Ref Range    BNP 23 0 - 99 pg/mL   Troponin I #2    Collection Time: 01/13/23  8:25 PM   Result Value Ref Range    Troponin I 0.010 0.000 - 0.026 ng/mL   Lipid panel    Collection Time: 01/13/23  8:25 PM   Result Value Ref Range    Cholesterol 167 120 - 199 mg/dL    Triglycerides 54 30 - 150 mg/dL    HDL 76 (H) 40 - 75 mg/dL    LDL Cholesterol 80.2 63.0 - 159.0 mg/dL    HDL/Cholesterol Ratio 45.5 20.0 - 50.0 %    Total Cholesterol/HDL Ratio 2.2 2.0 - 5.0    Non-HDL Cholesterol 91 mg/dL   TSH    Collection Time: 01/13/23  8:25 PM   Result Value Ref Range    TSH  0.469 0.400 - 4.000 uIU/mL   Troponin I    Collection Time: 01/14/23 12:10 AM   Result Value Ref Range    Troponin I <0.006 0.000 - 0.026 ng/mL   Basic Metabolic Panel (BMP)    Collection Time: 01/14/23  4:13 AM   Result Value Ref Range    Sodium 140 136 - 145 mmol/L    Potassium 3.9 3.5 - 5.1 mmol/L    Chloride 110 95 - 110 mmol/L    CO2 23 23 - 29 mmol/L    Glucose 105 70 - 110 mg/dL    BUN 16 8 - 23 mg/dL    Creatinine 1.1 0.5 - 1.4 mg/dL    Calcium 9.3 8.7 - 10.5 mg/dL    Anion Gap 7 (L) 8 - 16 mmol/L    eGFR >60 >60 mL/min/1.73 m^2   Troponin I    Collection Time: 01/14/23  4:13 AM   Result Value Ref Range    Troponin I <0.006 0.000 - 0.026 ng/mL   POCT glucose    Collection Time: 01/14/23  7:12 AM   Result Value Ref Range    POCT Glucose 94 70 - 110 mg/dL       Microbiology Results (last 7 days)     ** No results found for the last 168 hours. **          Imaging Results          X-Ray Chest AP Portable (Final result)  Result time 01/13/23 18:50:58    Final result by Thai Mchugh MD (01/13/23 18:50:58)                 Impression:      No acute cardiopulmonary process identified.      Electronically signed by: Thai Mchugh MD  Date:    01/13/2023  Time:    18:50             Narrative:    EXAMINATION:  XR CHEST AP PORTABLE    CLINICAL HISTORY:  Chest Pain;    TECHNIQUE:  Single frontal view of the chest was performed.    COMPARISON:  September 2022.    FINDINGS:  Cardiac silhouette is normal in size.  Lungs are symmetrically expanded.  No evidence of focal consolidative process, pneumothorax, or significant pleural effusion.  No acute osseous abnormality identified.                                    Pending Diagnostic Studies:     Procedure Component Value Units Date/Time    Echo [[id number]]     Order Status: Sent Lab Status: No result          Medications:  Reconciled Home Medications:      Medication List      START taking these medications    nitroGLYCERIN 0.4 MG SL tablet  Commonly known as:  NITROSTAT  Place 1 tablet (0.4 mg total) under the tongue every 5 (five) minutes as needed for Chest pain. Place 1 under tongue if you have Chest Pain every 5 minutes until pain resolves; if you take 3 and pain has not resolved, please come to the Emergency Room.        CONTINUE taking these medications    amlodipine-benazepril 10-20mg 10-20 mg per capsule  Commonly known as: LOTREL  Take 1 capsule by mouth once daily.     atorvastatin 20 MG tablet  Commonly known as: LIPITOR  Take 20 mg by mouth once daily.     carvediloL 12.5 MG tablet  Commonly known as: COREG  Take 12.5 mg by mouth 2 (two) times daily with meals.     chlorproMAZINE 25 MG tablet  Commonly known as: THORAZINE  Take 1 tablet (25 mg total) by mouth once as needed (hiccups).     FLUoxetine 20 MG capsule  Take 20 mg by mouth once daily.     levocetirizine 5 MG tablet  Commonly known as: XYZAL  Take 5 mg by mouth every evening.     memantine 5 MG Tab  Commonly known as: NAMENDA  Take 5 mg by mouth 2 (two) times daily.     omeprazole 20 MG capsule  Commonly known as: PRILOSEC  Take 1 capsule (20 mg total) by mouth once daily.     ranolazine 500 MG Tb12  Commonly known as: RANEXA  Take 500 mg by mouth 2 (two) times daily.            Indwelling Lines/Drains at time of discharge:   Lines/Drains/Airways     None                 Time spent on the discharge of patient: 35 minutes         Clive Desai MD  Department of Hospital Medicine  [facility]

## 2023-01-14 NOTE — ASSESSMENT & PLAN NOTE
Antihyperglycemics (From admission, onward)    Start     Stop Route Frequency Ordered    01/13/23 2343  insulin aspart U-100 pen 0-5 Units         -- SubQ Before meals & nightly PRN 01/13/23 2243        Hold Oral hypoglycemics while patient is in the hospital.  Low SS, goal 140-180  Will Check A1C

## 2023-01-14 NOTE — HPI
"Rafael Mei 72 y.o. male with HTN, HLD, DM, CAD (with 7 stents) hospital with a chief complaint of chest pain, acute onset while working out at the gym, the patient stated that he wrote about 3 miles on a stationary bicycle, did squats, and then started running on the treadmill when he began having substernal pressure-like pain that did not radiate that he rated 4/10.  He then sat down and rested for about 15 minutes without pain relief, he denies any lightheadedness diaphoresis, nausea, shortness of breath, facial droop, slurred speech, or other associated problems.  Patient was given 2 doses of sublingual nitroglycerin in the ED with complete relief of symptoms.      In the ED patient was afebrile without leukocytosis, potassium 5.3, EGFR 59, glucose 119, alkaline phosphatase 52, HDL 76, BNP and troponin negative, CXR negative, EKG showed "Normal sinus rhythm Possible Anterior infarct ,age undetermined" without changes from previous, patient placed in OBS for ACS rule out.  TRANSTHORACIC ECHOCARDIOGRAM REPORT   Name:   RAFAEL         MRN#:        [id number]   Study Date:    3/30/2021 SUMMARY:    1. Hyperdynamic left ventricular systolic function. LVEF of 65 to 70%.    2. Mildly elevated pulmonary artery pressure.    3. Normal right ventricular systolic function.    4. Normal central venous pressure.    5. Dilated aortic root (4.0 cm).   "

## 2023-01-14 NOTE — ED TRIAGE NOTES
Patient c/o midsternal CP started while running in treadmill PTA. Reprorts pain as non-radiating with no associated symptoms of dizziness, SOB, or N/V. Patient reports pain level is about a 1/10

## 2023-01-14 NOTE — ASSESSMENT & PLAN NOTE
Currently placed in OBS for ACS rule out, per chart review patient has history of 7 stents, most recent MISTY as above.

## 2023-01-14 NOTE — SUBJECTIVE & OBJECTIVE
Past Medical History:   Diagnosis Date    Cancer     prostate    Diabetes mellitus     Hypertension        History reviewed. No pertinent surgical history.    Review of patient's allergies indicates:   Allergen Reactions    Penicillins     Contrast media Hives and Itching       No current facility-administered medications on file prior to encounter.     Current Outpatient Medications on File Prior to Encounter   Medication Sig    amlodipine-benazepril 10-20mg (LOTREL) 10-20 mg per capsule Take 1 capsule by mouth once daily.    atorvastatin (LIPITOR) 20 MG tablet Take 20 mg by mouth once daily.    carvediloL (COREG) 12.5 MG tablet Take 12.5 mg by mouth 2 (two) times daily with meals.    FLUoxetine 20 MG capsule Take 20 mg by mouth once daily.    levocetirizine (XYZAL) 5 MG tablet Take 5 mg by mouth every evening.    memantine (NAMENDA) 5 MG Tab Take 5 mg by mouth 2 (two) times daily.    ranolazine (RANEXA) 500 MG Tb12 Take 500 mg by mouth 2 (two) times daily.    chlorproMAZINE (THORAZINE) 25 MG tablet Take 1 tablet (25 mg total) by mouth once as needed (hiccups).    omeprazole (PRILOSEC) 20 MG capsule Take 1 capsule (20 mg total) by mouth once daily.     Family History    None       Tobacco Use    Smoking status: Never    Smokeless tobacco: Never   Substance and Sexual Activity    Alcohol use: Not on file    Drug use: Not on file    Sexual activity: Not on file     Review of Systems   Constitutional:  Negative for chills and fever.   HENT:  Negative for congestion and rhinorrhea.    Eyes:  Negative for photophobia and visual disturbance.   Respiratory:  Positive for chest tightness. Negative for cough and shortness of breath.    Cardiovascular:  Positive for chest pain. Negative for palpitations and leg swelling.   Gastrointestinal:  Negative for abdominal pain, diarrhea, nausea and vomiting.   Genitourinary:  Negative for frequency, hematuria and urgency.   Skin:  Negative for pallor, rash and wound.   Neurological:   Negative for light-headedness and headaches.   Psychiatric/Behavioral:  Negative for confusion and decreased concentration.    Objective:     Vital Signs (Most Recent):  Temp: 98.5 °F (36.9 °C) (01/13/23 2132)  Pulse: 63 (01/13/23 2132)  Resp: 17 (01/13/23 2132)  BP: (!) 164/81 (01/13/23 2132)  SpO2: 100 % (01/13/23 2132)   Vital Signs (24h Range):  Temp:  [97.6 °F (36.4 °C)-98.5 °F (36.9 °C)] 98.5 °F (36.9 °C)  Pulse:  [58-68] 63  Resp:  [12-17] 17  SpO2:  [96 %-100 %] 100 %  BP: (110-177)/(56-89) 164/81     Weight: 84.8 kg (187 lb)  Body mass index is 25.36 kg/m².    Physical Exam  Vitals and nursing note reviewed.   Constitutional:       General: He is not in acute distress.     Appearance: He is well-developed.   HENT:      Head: Normocephalic and atraumatic.      Right Ear: External ear normal.      Left Ear: External ear normal.      Nose: Nose normal.   Eyes:      Conjunctiva/sclera: Conjunctivae normal.      Pupils: Pupils are equal, round, and reactive to light.   Cardiovascular:      Rate and Rhythm: Normal rate and regular rhythm.      Pulses: Normal pulses.      Heart sounds: No murmur heard.  Pulmonary:      Effort: Pulmonary effort is normal. No respiratory distress.      Breath sounds: Normal breath sounds. No wheezing or rales.   Abdominal:      General: Bowel sounds are normal. There is no distension.      Palpations: Abdomen is soft.      Tenderness: There is no abdominal tenderness.      Comments: No palpable hepatomegaly or splenomegaly   Musculoskeletal:         General: No tenderness. Normal range of motion.      Cervical back: Normal range of motion and neck supple.   Skin:     General: Skin is warm and dry.   Neurological:      Mental Status: He is alert and oriented to person, place, and time.   Psychiatric:         Thought Content: Thought content normal.         CRANIAL NERVES     CN III, IV, VI   Pupils are equal, round, and reactive to light.     Significant Labs: All pertinent labs  within the past 24 hours have been reviewed.  Recent Lab Results         01/13/23 2025 01/13/23  1743        Albumin   4.1       Alkaline Phosphatase   52       ALT   37       Anion Gap   8       AST   30       Baso #   0.05       Basophil %   0.7       BILIRUBIN TOTAL   0.3  Comment: For infants and newborns, interpretation of results should be based  on gestational age, weight and in agreement with clinical  observations.    Premature Infant recommended reference ranges:  Up to 24 hours.............<8.0 mg/dL  Up to 48 hours............<12.0 mg/dL  3-5 days..................<15.0 mg/dL  6-29 days.................<15.0 mg/dL         BNP   23  Comment: Values of less than 100 pg/ml are consistent with non-CHF populations.       BUN   21       Calcium   9.7       Chloride   107       Cholesterol 167  Comment: The National Cholesterol Education Program (NCEP) has set the  following guidelines (reference ranges) for Cholesterol:  Optimal.....................<200 mg/dL  Borderline High.............200-239 mg/dL  High........................> or = 240 mg/dL           CO2   24       Creatinine   1.3       Differential Method   Automated       eGFR   59       Eos #   0.1       Eosinophil %   1.6       Glucose   119       Gran # (ANC)   5.1       Gran %   70.2       HDL 76  Comment: The National Cholesterol Education Program (NCEP) has set the  following guidelines (reference values) for HDL Cholesterol:  Low...............<40 mg/dL  Optimal...........>60 mg/dL           HDL/Cholesterol Ratio 45.5         Hematocrit   38.2       Hemoglobin   13.7       Immature Grans (Abs)   0.03  Comment: Mild elevation in immature granulocytes is non specific and   can be seen in a variety of conditions including stress response,   acute inflammation, trauma and pregnancy. Correlation with other   laboratory and clinical findings is essential.         Immature Granulocytes   0.4       LDL Cholesterol External 80.2  Comment: The  National Cholesterol Education Program (NCEP) has set the  following guidelines (reference values) for LDL Cholesterol:  Optimal.......................<130 mg/dL  Borderline High...............130-159 mg/dL  High..........................160-189 mg/dL  Very High.....................>190 mg/dL           Lymph #   1.2       Lymph %   15.9       MCH   29.7       MCHC   35.9       MCV   83       Mono #   0.8       Mono %   11.2       MPV   8.8       Non-HDL Cholesterol 91  Comment: Risk category and Non-HDL cholesterol goals:  Coronary heart disease (CHD)or equivalent (10-year risk of CHD >20%):  Non-HDL cholesterol goal     <130 mg/dL  Two or more CHD risk factors and 10-year risk of CHD <= 20%:  Non-HDL cholesterol goal     <160 mg/dL  0 to 1 CHD risk factor:  Non-HDL cholesterol goal     <190 mg/dL           nRBC   0       Platelets   263       Potassium   5.3       PROTEIN TOTAL   7.2       RBC   4.61       RDW   13.6       Sodium   139       Total Cholesterol/HDL Ratio 2.2         Triglycerides 54  Comment: The National Cholesterol Education Program (NCEP) has set the  following guidelines (reference values) for triglycerides:  Normal......................<150 mg/dL  Borderline High.............150-199 mg/dL  High........................200-499 mg/dL           Troponin I 0.010  Comment: The reference interval for Troponin I represents the 99th percentile   cutoff   for our facility and is consistent with 3rd generation assay   performance.     <0.006  Comment: The reference interval for Troponin I represents the 99th percentile   cutoff   for our facility and is consistent with 3rd generation assay   performance.         WBC   7.31               Significant Imaging: I have reviewed all pertinent imaging results/findings within the past 24 hours.  Imaging Results              X-Ray Chest AP Portable (Final result)  Result time 01/13/23 18:50:58      Final result by Thai Mchugh MD (01/13/23 18:50:58)                    Impression:      No acute cardiopulmonary process identified.      Electronically signed by: Thai Mchugh MD  Date:    01/13/2023  Time:    18:50               Narrative:    EXAMINATION:  XR CHEST AP PORTABLE    CLINICAL HISTORY:  Chest Pain;    TECHNIQUE:  Single frontal view of the chest was performed.    COMPARISON:  September 2022.    FINDINGS:  Cardiac silhouette is normal in size.  Lungs are symmetrically expanded.  No evidence of focal consolidative process, pneumothorax, or significant pleural effusion.  No acute osseous abnormality identified.

## 2023-07-20 ENCOUNTER — HOSPITAL ENCOUNTER (EMERGENCY)
Facility: HOSPITAL | Age: 73
Discharge: HOME | End: 2023-07-20
Attending: EMERGENCY MEDICINE
Payer: MEDICARE

## 2023-07-20 VITALS
HEART RATE: 74 BPM | TEMPERATURE: 98 F | BODY MASS INDEX: 24.38 KG/M2 | HEIGHT: 72 IN | RESPIRATION RATE: 18 BRPM | DIASTOLIC BLOOD PRESSURE: 78 MMHG | SYSTOLIC BLOOD PRESSURE: 147 MMHG | WEIGHT: 180 LBS | OXYGEN SATURATION: 100 %

## 2023-07-20 DIAGNOSIS — R41.3 MEMORY LOSS: Primary | ICD-10-CM

## 2023-07-20 LAB
ALBUMIN SERPL BCP-MCNC: 4.2 G/DL (ref 3.5–5.2)
ALP SERPL-CCNC: 45 U/L (ref 55–135)
ALT SERPL W/O P-5'-P-CCNC: 31 U/L (ref 10–44)
ANION GAP SERPL CALC-SCNC: 9 MMOL/L (ref 8–16)
AST SERPL-CCNC: 35 U/L (ref 10–40)
BASOPHILS # BLD AUTO: 0.03 K/UL (ref 0–0.2)
BASOPHILS NFR BLD: 0.5 % (ref 0–1.9)
BILIRUB SERPL-MCNC: 0.7 MG/DL (ref 0.1–1)
BILIRUB UR QL STRIP: ABNORMAL
BUN SERPL-MCNC: 15 MG/DL (ref 8–23)
CALCIUM SERPL-MCNC: 9.7 MG/DL (ref 8.7–10.5)
CHLORIDE SERPL-SCNC: 105 MMOL/L (ref 95–110)
CLARITY UR: CLEAR
CO2 SERPL-SCNC: 25 MMOL/L (ref 23–29)
COLOR UR: YELLOW
CREAT SERPL-MCNC: 1.3 MG/DL (ref 0.5–1.4)
DIFFERENTIAL METHOD: ABNORMAL
EOSINOPHIL # BLD AUTO: 0.1 K/UL (ref 0–0.5)
EOSINOPHIL NFR BLD: 1.3 % (ref 0–8)
ERYTHROCYTE [DISTWIDTH] IN BLOOD BY AUTOMATED COUNT: 13.3 % (ref 11.5–14.5)
EST. GFR  (NO RACE VARIABLE): 59 ML/MIN/1.73 M^2
GLUCOSE SERPL-MCNC: 130 MG/DL (ref 70–110)
GLUCOSE UR QL STRIP: NEGATIVE
HCT VFR BLD AUTO: 35.5 % (ref 40–54)
HGB BLD-MCNC: 12.9 G/DL (ref 14–18)
HGB UR QL STRIP: NEGATIVE
IMM GRANULOCYTES # BLD AUTO: 0.03 K/UL (ref 0–0.04)
IMM GRANULOCYTES NFR BLD AUTO: 0.5 % (ref 0–0.5)
KETONES UR QL STRIP: ABNORMAL
LEUKOCYTE ESTERASE UR QL STRIP: NEGATIVE
LYMPHOCYTES # BLD AUTO: 1.5 K/UL (ref 1–4.8)
LYMPHOCYTES NFR BLD: 24 % (ref 18–48)
MAGNESIUM SERPL-MCNC: 1.7 MG/DL (ref 1.6–2.6)
MCH RBC QN AUTO: 29.4 PG (ref 27–31)
MCHC RBC AUTO-ENTMCNC: 36.3 G/DL (ref 32–36)
MCV RBC AUTO: 81 FL (ref 82–98)
MONOCYTES # BLD AUTO: 0.6 K/UL (ref 0.3–1)
MONOCYTES NFR BLD: 10.6 % (ref 4–15)
NEUTROPHILS # BLD AUTO: 3.8 K/UL (ref 1.8–7.7)
NEUTROPHILS NFR BLD: 63.1 % (ref 38–73)
NITRITE UR QL STRIP: NEGATIVE
NRBC BLD-RTO: 0 /100 WBC
PH UR STRIP: 6 [PH] (ref 5–8)
PLATELET # BLD AUTO: 262 K/UL (ref 150–450)
PMV BLD AUTO: 8.9 FL (ref 9.2–12.9)
POCT GLUCOSE: 137 MG/DL (ref 70–110)
POTASSIUM SERPL-SCNC: 4 MMOL/L (ref 3.5–5.1)
PROT SERPL-MCNC: 6.9 G/DL (ref 6–8.4)
PROT UR QL STRIP: ABNORMAL
RBC # BLD AUTO: 4.39 M/UL (ref 4.6–6.2)
SODIUM SERPL-SCNC: 139 MMOL/L (ref 136–145)
SP GR UR STRIP: 1.03 (ref 1–1.03)
TSH SERPL DL<=0.005 MIU/L-ACNC: 0.79 UIU/ML (ref 0.4–4)
URN SPEC COLLECT METH UR: ABNORMAL
UROBILINOGEN UR STRIP-ACNC: 1 EU/DL
WBC # BLD AUTO: 6.03 K/UL (ref 3.9–12.7)

## 2023-07-20 PROCEDURE — 85025 COMPLETE CBC W/AUTO DIFF WBC: CPT | Performed by: EMERGENCY MEDICINE

## 2023-07-20 PROCEDURE — 82962 GLUCOSE BLOOD TEST: CPT

## 2023-07-20 PROCEDURE — 81003 URINALYSIS AUTO W/O SCOPE: CPT

## 2023-07-20 PROCEDURE — 80053 COMPREHEN METABOLIC PANEL: CPT | Performed by: EMERGENCY MEDICINE

## 2023-07-20 PROCEDURE — 83735 ASSAY OF MAGNESIUM: CPT | Performed by: EMERGENCY MEDICINE

## 2023-07-20 PROCEDURE — 84443 ASSAY THYROID STIM HORMONE: CPT | Performed by: EMERGENCY MEDICINE

## 2023-07-20 PROCEDURE — 99284 EMERGENCY DEPT VISIT MOD MDM: CPT | Mod: 25

## 2023-07-20 NOTE — ED PROVIDER NOTES
"Encounter Date: 7/20/2023    SCRIBE #1 NOTE: I, Jerry Rowe, am scribing for, and in the presence of,  Lucas Cortes MD. Other sections scribed: HPI, ROS, PE.     History     Chief Complaint   Patient presents with    Memory Loss     Pt reports feeling like he has "foggy thinking" since yesterday evening. Pt reports "I couldn't even remember by daughter's name". Pt denies dizziness, blurry vision, headache, weakness, chest pain, shortness of breath. Pt seems withdrawn in triage, answering questions with short replies and not elaborating.     CC: Memory loss    HPI: History is provided by independent historian. This is a 72 y.o. M who has DM, HTN, and Hx of prostate cancer who presents to the ED for emergent evaluation of acute memory loss that began approximately 2 months ago that worsened within the last week. Pt states that he could not remember his daughters name this morning. He states that he has never been evaluated for the memory loss. He has a PCP, but he has not discussed the memory loss with his PCP. Pt reports a Hx of CAD. He reports occasional alcohol use. He last consumed less than half a glass of wine last week. Pt has no surgical history. His allergies includes Penicillin, and Contrast media. Pt denies SOB, arm pain, leg pain, leg swelling, bowel or bladder incontinence, dizziness, unilateral weakness, tobacco use, or recreational drug use.    The history is provided by the patient. No  was used.   Review of patient's allergies indicates:   Allergen Reactions    Penicillins     Contrast media Hives and Itching     Past Medical History:   Diagnosis Date    Cancer     prostate    Diabetes mellitus     Hypertension      No past surgical history on file.  No family history on file.  Social History     Tobacco Use    Smoking status: Never    Smokeless tobacco: Never     Review of Systems   Constitutional:  Negative for chills and fever.   HENT:  Negative for congestion, rhinorrhea " and sore throat.    Eyes:  Negative for pain and visual disturbance.   Respiratory:  Negative for chest tightness and shortness of breath.    Cardiovascular:  Negative for chest pain and leg swelling.   Gastrointestinal:  Negative for abdominal pain, diarrhea, nausea and vomiting.        (-) Bowel incontinence   Endocrine: Negative for polydipsia and polyuria.   Genitourinary:  Negative for dysuria and flank pain.        (-) Bladder incontinence   Musculoskeletal:  Negative for back pain, myalgias, neck pain and neck stiffness.   Skin:  Negative for rash.   Allergic/Immunologic: Negative for immunocompromised state.   Neurological:  Negative for dizziness, weakness and headaches.   Hematological:  Does not bruise/bleed easily.   Psychiatric/Behavioral:          (+) Memory loss     Physical Exam     Initial Vitals [07/20/23 0944]   BP Pulse Resp Temp SpO2   122/63 77 18 97.9 °F (36.6 °C) 97 %      MAP       --         Physical Exam    Nursing note and vitals reviewed.  Constitutional: He appears well-developed and well-nourished.   HENT:   Head: Normocephalic and atraumatic.   Mouth/Throat: Oropharynx is clear and moist.   Eyes: EOM are normal. Pupils are equal, round, and reactive to light.   Neck:   Normal range of motion.  Cardiovascular:  Normal rate and regular rhythm.           Pulmonary/Chest: Breath sounds normal. No stridor. No respiratory distress. He has no wheezes.   Abdominal: Abdomen is soft. Bowel sounds are normal. He exhibits no distension. There is no abdominal tenderness.   Musculoskeletal:         General: No tenderness or edema. Normal range of motion.      Cervical back: Normal range of motion.     Neurological: He is alert and oriented to person, place, and time. He has normal strength. No cranial nerve deficit or sensory deficit. GCS score is 15. GCS eye subscore is 4. GCS verbal subscore is 5. GCS motor subscore is 6.   Normal speech. Pt is able to repeat [employer]; I walked down to  the store.   Skin: Skin is warm and dry. Capillary refill takes less than 2 seconds.   Psychiatric: He has a normal mood and affect. Thought content normal.       ED Course   Procedures  Labs Reviewed   URINALYSIS, REFLEX TO URINE CULTURE - Abnormal; Notable for the following components:       Result Value    Protein, UA Trace (*)     Ketones, UA Trace (*)     Bilirubin (UA) 1+ (*)     All other components within normal limits    Narrative:     Specimen Source->Urine   COMPREHENSIVE METABOLIC PANEL - Abnormal; Notable for the following components:    Glucose 130 (*)     Alkaline Phosphatase 45 (*)     eGFR 59 (*)     All other components within normal limits   CBC W/ AUTO DIFFERENTIAL - Abnormal; Notable for the following components:    RBC 4.39 (*)     Hemoglobin 12.9 (*)     Hematocrit 35.5 (*)     MCV 81 (*)     MCHC 36.3 (*)     MPV 8.9 (*)     All other components within normal limits   POCT GLUCOSE - Abnormal; Notable for the following components:    POCT Glucose 137 (*)     All other components within normal limits   MAGNESIUM   TSH          Imaging Results              CT Head Without Contrast (Final result)  Result time 07/20/23 11:40:22      Final result by Esvin Jensen DO (07/20/23 11:40:22)                   Impression:      No acute intracranial abnormality.    Chronic microvascular ischemic changes.      Electronically signed by: Esvin Jensen  Date:    07/20/2023  Time:    11:40               Narrative:    EXAMINATION:  CT HEAD WITHOUT CONTRAST    CLINICAL HISTORY:  Mental status change, unknown cause;    TECHNIQUE:  Low dose axial CT images obtained throughout the head without intravenous contrast. Sagittal and coronal reconstructions were performed.    COMPARISON:  None available.    FINDINGS:  Ventricles and sulci are normal in size for age without evidence of hydrocephalus. No extra-axial blood or fluid collections.  There is hypoattenuation in the supratentorial white matter compatible with  chronic microvascular ischemic changes.  No parenchymal mass, hemorrhage, edema or major vascular distribution infarct.    No calvarial fracture.  The scalp is unremarkable.  Bilateral paranasal sinuses and mastoid air cells are clear.                                       Medications - No data to display  Medical Decision Making:   Initial Assessment:   Shared decision making with patient.     72-year-old male presenting secondary to progressive memory loss over the past couple months.  Able to answer all questions here.  No focal neurological deficits.  Vitals reassuring.  Look for any major signs of infection electrolyte abnormalities.  CT head does not show anything acute.  Patient to follow-up with primary care and also Neurology further workup and management.  Possibly early dementia. I discussed with the patient/family the diagnosis, treatment plan, indications for return to the emergency department, and for expected follow-up. The patient/family verbalized an understanding. The patient/family is asked if there are any questions or concerns. We discuss the case, until all issues are addressed to the patient/family's satisfaction. Patient/family understands and is agreeable to the plan.   Lucas Cortes    DISCLAIMER: This note was prepared with [employer] voice recognition transcription software. Garbled syntax, mangled pronouns, and other bizarre constructions may be attributed to that software system.    Clinical Tests:   Lab Tests: Ordered and Reviewed  Radiological Study: Ordered and Reviewed        Scribe Attestation:   Scribe #1: I performed the above scribed service and the documentation accurately describes the services I performed. I attest to the accuracy of the note.                 I, lucas cortes, personally performed the services described in this documentation. All medical record entries made by the scribe were at my direction and in my presence. I have reviewed the chart and agree that the record  reflects my personal performance and is accurate and complete.    Clinical Impression:   Final diagnoses:  [R41.3] Memory loss (Primary)        ED Disposition Condition    Discharge Stable          ED Prescriptions    None       Follow-up Information       Follow up With Specialties Details Why Contact Info     [facility]  Schedule an appointment as soon as possible for a visit in 2 days  [address] [place] [address]  [phone]      Ulises Horne MD Neurology Schedule an appointment as soon as possible for a visit in 2 days  [address]  [address]  [facility] [address]  [phone]               Lucas Cortes MD  07/20/23 6563

## 2023-07-20 NOTE — ED NOTES
Pt made aware of need for urine sample. Unable to provide at this time. Pt instructed on collection, verbalized understanding. Specimen cup at bedside, will make RN aware when able to provide.

## 2023-07-20 NOTE — DISCHARGE INSTRUCTIONS
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
## 2023-11-01 ENCOUNTER — OFFICE VISIT (OUTPATIENT)
Dept: NEUROLOGY | Facility: CLINIC | Age: 73
End: 2023-11-01
Payer: MEDICARE

## 2023-11-01 DIAGNOSIS — F03.94 NEURODEGENERATIVE DEMENTIA WITH ANXIETY: Primary | ICD-10-CM

## 2023-11-01 PROCEDURE — 99499 UNLISTED E&M SERVICE: CPT | Mod: 95,,, | Performed by: PSYCHIATRY & NEUROLOGY

## 2023-11-01 PROCEDURE — 96116 NUBHVL XM PHYS/QHP 1ST HR: CPT | Mod: 95,,, | Performed by: PSYCHIATRY & NEUROLOGY

## 2023-11-01 PROCEDURE — 96116 PR NEUROBEHAVIORAL STATUS EXAM BY PSYCH/PHYS: ICD-10-PCS | Mod: 95,,, | Performed by: PSYCHIATRY & NEUROLOGY

## 2023-11-01 PROCEDURE — 99499 NO LOS: ICD-10-PCS | Mod: 95,,, | Performed by: PSYCHIATRY & NEUROLOGY

## 2023-11-06 ENCOUNTER — OFFICE VISIT (OUTPATIENT)
Dept: NEUROLOGY | Facility: CLINIC | Age: 73
End: 2023-11-06
Payer: MEDICARE

## 2023-11-06 DIAGNOSIS — F03.94 NEURODEGENERATIVE DEMENTIA WITH ANXIETY: Primary | ICD-10-CM

## 2023-11-06 PROCEDURE — 99499 UNLISTED E&M SERVICE: CPT | Mod: S$GLB,,, | Performed by: PSYCHIATRY & NEUROLOGY

## 2023-11-06 PROCEDURE — 99499 NO LOS: ICD-10-PCS | Mod: S$GLB,,, | Performed by: PSYCHIATRY & NEUROLOGY

## 2023-11-06 PROCEDURE — 96133 PR NEUROPSYCHOLOGIC TEST EVAL SVCS, EA ADDTL HR: ICD-10-PCS | Mod: S$GLB,,, | Performed by: PSYCHIATRY & NEUROLOGY

## 2023-11-06 PROCEDURE — 96132 NRPSYC TST EVAL PHYS/QHP 1ST: CPT | Mod: S$GLB,,, | Performed by: PSYCHIATRY & NEUROLOGY

## 2023-11-06 PROCEDURE — 96133 NRPSYC TST EVAL PHYS/QHP EA: CPT | Mod: S$GLB,,, | Performed by: PSYCHIATRY & NEUROLOGY

## 2023-11-06 PROCEDURE — 96132 PR NEUROPSYCHOLOGIC TEST EVAL SVCS, 1ST HR: ICD-10-PCS | Mod: S$GLB,,, | Performed by: PSYCHIATRY & NEUROLOGY

## 2023-11-06 PROCEDURE — 96138 PSYCL/NRPSYC TECH 1ST: CPT | Mod: S$GLB,,, | Performed by: PSYCHIATRY & NEUROLOGY

## 2023-11-06 PROCEDURE — 96139 PR PSYCH/NEUROPSYCH TEST ADMIN/SCORING, BY TECH, 2+ TESTS, EA ADDTL 30 MIN: ICD-10-PCS | Mod: S$GLB,,, | Performed by: PSYCHIATRY & NEUROLOGY

## 2023-11-06 PROCEDURE — 96138 PR PSYCH/NEUROPSYCH TEST ADMIN/SCORING, BY TECH, 2+ TESTS, 1ST 30 MIN: ICD-10-PCS | Mod: S$GLB,,, | Performed by: PSYCHIATRY & NEUROLOGY

## 2023-11-06 PROCEDURE — 96139 PSYCL/NRPSYC TST TECH EA: CPT | Mod: S$GLB,,, | Performed by: PSYCHIATRY & NEUROLOGY
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
## 2023-11-08 PROBLEM — F03.94: Status: ACTIVE | Noted: 2023-11-08

## 2023-11-08 NOTE — ASSESSMENT & PLAN NOTE
Level of Care: He may require a higher level of care in the near future.     Follow-up: Testing on 11/6.

## 2023-11-09 NOTE — ASSESSMENT & PLAN NOTE
Level of Care: He may require a higher level of care in the near future, not only to monitor his safety, but also to monitor for emergence of other neuropsychiatric symptoms (hallucinations, delusions, etc).     Driving: Recommended that he discontinue driving or, at a minimum, undergo a formal, on-the-road driving evaluation. However, it is suspected that he would not pass a driving evaluation based on neuropsychological test performance.     Neuropsychiatric Symptoms: Anxiety may be a point of intervention.      Follow-up: PRN.

## 2023-11-16 ENCOUNTER — OFFICE VISIT (OUTPATIENT)
Dept: NEUROLOGY | Facility: CLINIC | Age: 73
End: 2023-11-16
Payer: MEDICARE

## 2023-11-16 DIAGNOSIS — F03.94 NEURODEGENERATIVE DEMENTIA WITH ANXIETY: Primary | ICD-10-CM

## 2023-11-16 PROCEDURE — 99499 UNLISTED E&M SERVICE: CPT | Mod: S$GLB,,, | Performed by: PSYCHIATRY & NEUROLOGY

## 2023-11-16 PROCEDURE — 99499 NO LOS: ICD-10-PCS | Mod: S$GLB,,, | Performed by: PSYCHIATRY & NEUROLOGY

## 2023-11-16 NOTE — PROGRESS NOTES
NEUROPSYCHOLOGICAL EVALUATION - CONFIDENTIAL  FEEDBACK NOTE    On 11/16/2023, I provided Mr. Artie Mei and his daughter the neuropsychological evaluation results. Please see the full report for a comprehensive overview of the findings. Mr. Mei was provided a copy of the report and invited to call with additional questions.      gO Navarro Psy.D., ABELARDOP  Board Certified in Clinical Neuropsychology  [facility] - Department of Neurology

## 2023-12-28 DIAGNOSIS — R13.19 OTHER DYSPHAGIA: Primary | ICD-10-CM

## 2024-07-08 ENCOUNTER — TELEPHONE (OUTPATIENT)
Dept: UROLOGY | Facility: CLINIC | Age: 74
End: 2024-07-08
Payer: MEDICARE

## 2024-07-08 NOTE — TELEPHONE ENCOUNTER
Staff called pt in regards to setting apt for bump on penis , pt was told he can see his PCP or go to [facility] he choose to see PCP.

## 2024-11-04 DIAGNOSIS — G30.9 ALZHEIMER'S DISEASE: Primary | ICD-10-CM

## 2024-11-04 DIAGNOSIS — F02.80 ALZHEIMER'S DISEASE: Primary | ICD-10-CM

## 2025-07-19 ENCOUNTER — HOSPITAL ENCOUNTER (OUTPATIENT)
Facility: HOSPITAL | Age: 75
Discharge: HOME | End: 2025-07-21
Attending: STUDENT IN AN ORGANIZED HEALTH CARE EDUCATION/TRAINING PROGRAM | Admitting: STUDENT IN AN ORGANIZED HEALTH CARE EDUCATION/TRAINING PROGRAM
Payer: MEDICARE

## 2025-07-19 DIAGNOSIS — R07.9 CHEST PAIN: ICD-10-CM

## 2025-07-19 LAB
ABSOLUTE EOSINOPHIL (OHS): 0.03 K/UL
ABSOLUTE MONOCYTE (OHS): 0.8 K/UL (ref 0.3–1)
ABSOLUTE NEUTROPHIL COUNT (OHS): 4.06 K/UL (ref 1.8–7.7)
ALBUMIN SERPL BCP-MCNC: 3.7 G/DL (ref 3.5–5.2)
ALP SERPL-CCNC: 50 UNIT/L (ref 40–150)
ALT SERPL W/O P-5'-P-CCNC: 28 UNIT/L (ref 10–44)
ANION GAP (OHS): 12 MMOL/L (ref 8–16)
AST SERPL-CCNC: 24 UNIT/L (ref 11–45)
BASOPHILS # BLD AUTO: 0.04 K/UL
BASOPHILS NFR BLD AUTO: 0.6 %
BILIRUB SERPL-MCNC: 0.3 MG/DL (ref 0.1–1)
BNP SERPL-MCNC: 28 PG/ML (ref 0–99)
BUN SERPL-MCNC: 17 MG/DL (ref 8–23)
CALCIUM SERPL-MCNC: 8.8 MG/DL (ref 8.7–10.5)
CHLORIDE SERPL-SCNC: 104 MMOL/L (ref 95–110)
CO2 SERPL-SCNC: 24 MMOL/L (ref 23–29)
CREAT SERPL-MCNC: 1.4 MG/DL (ref 0.5–1.4)
ERYTHROCYTE [DISTWIDTH] IN BLOOD BY AUTOMATED COUNT: 14 % (ref 11.5–14.5)
GFR SERPLBLD CREATININE-BSD FMLA CKD-EPI: 53 ML/MIN/1.73/M2
GLUCOSE SERPL-MCNC: 173 MG/DL (ref 70–110)
HCT VFR BLD AUTO: 33.9 % (ref 40–54)
HGB BLD-MCNC: 11.9 GM/DL (ref 14–18)
IMM GRANULOCYTES # BLD AUTO: 0.03 K/UL (ref 0–0.04)
IMM GRANULOCYTES NFR BLD AUTO: 0.5 % (ref 0–0.5)
LYMPHOCYTES # BLD AUTO: 1.4 K/UL (ref 1–4.8)
MAGNESIUM SERPL-MCNC: 2 MG/DL (ref 1.6–2.6)
MCH RBC QN AUTO: 28.7 PG (ref 27–31)
MCHC RBC AUTO-ENTMCNC: 35.1 G/DL (ref 32–36)
MCV RBC AUTO: 82 FL (ref 82–98)
NUCLEATED RBC (/100WBC) (OHS): 0 /100 WBC
PLATELET # BLD AUTO: 215 K/UL (ref 150–450)
PMV BLD AUTO: 8.9 FL (ref 9.2–12.9)
POCT GLUCOSE: 91 MG/DL (ref 70–110)
POTASSIUM SERPL-SCNC: 3.9 MMOL/L (ref 3.5–5.1)
PROT SERPL-MCNC: 6.7 GM/DL (ref 6–8.4)
RBC # BLD AUTO: 4.14 M/UL (ref 4.6–6.2)
RELATIVE EOSINOPHIL (OHS): 0.5 %
RELATIVE LYMPHOCYTE (OHS): 22 % (ref 18–48)
RELATIVE MONOCYTE (OHS): 12.6 % (ref 4–15)
RELATIVE NEUTROPHIL (OHS): 63.8 % (ref 38–73)
SODIUM SERPL-SCNC: 140 MMOL/L (ref 136–145)
TROPONIN I SERPL DL<=0.01 NG/ML-MCNC: 0.01 NG/ML
TROPONIN I SERPL DL<=0.01 NG/ML-MCNC: <0.006 NG/ML
TROPONIN I SERPL DL<=0.01 NG/ML-MCNC: <0.006 NG/ML
WBC # BLD AUTO: 6.36 K/UL (ref 3.9–12.7)

## 2025-07-19 PROCEDURE — G0378 HOSPITAL OBSERVATION PER HR: HCPCS

## 2025-07-19 PROCEDURE — 96375 TX/PRO/DX INJ NEW DRUG ADDON: CPT

## 2025-07-19 PROCEDURE — 83880 ASSAY OF NATRIURETIC PEPTIDE: CPT | Performed by: STUDENT IN AN ORGANIZED HEALTH CARE EDUCATION/TRAINING PROGRAM

## 2025-07-19 PROCEDURE — 96374 THER/PROPH/DIAG INJ IV PUSH: CPT

## 2025-07-19 PROCEDURE — 93010 ELECTROCARDIOGRAM REPORT: CPT | Mod: ,,, | Performed by: INTERNAL MEDICINE

## 2025-07-19 PROCEDURE — 25000003 PHARM REV CODE 250: Performed by: REGISTERED NURSE

## 2025-07-19 PROCEDURE — 93005 ELECTROCARDIOGRAM TRACING: CPT

## 2025-07-19 PROCEDURE — 84484 ASSAY OF TROPONIN QUANT: CPT | Performed by: STUDENT IN AN ORGANIZED HEALTH CARE EDUCATION/TRAINING PROGRAM

## 2025-07-19 PROCEDURE — 80053 COMPREHEN METABOLIC PANEL: CPT | Performed by: STUDENT IN AN ORGANIZED HEALTH CARE EDUCATION/TRAINING PROGRAM

## 2025-07-19 PROCEDURE — 85025 COMPLETE CBC W/AUTO DIFF WBC: CPT | Performed by: STUDENT IN AN ORGANIZED HEALTH CARE EDUCATION/TRAINING PROGRAM

## 2025-07-19 PROCEDURE — 84484 ASSAY OF TROPONIN QUANT: CPT | Mod: 91 | Performed by: REGISTERED NURSE

## 2025-07-19 PROCEDURE — 83735 ASSAY OF MAGNESIUM: CPT | Performed by: STUDENT IN AN ORGANIZED HEALTH CARE EDUCATION/TRAINING PROGRAM

## 2025-07-19 PROCEDURE — 82962 GLUCOSE BLOOD TEST: CPT

## 2025-07-19 PROCEDURE — 63600175 PHARM REV CODE 636 W HCPCS: Performed by: STUDENT IN AN ORGANIZED HEALTH CARE EDUCATION/TRAINING PROGRAM

## 2025-07-19 PROCEDURE — 99285 EMERGENCY DEPT VISIT HI MDM: CPT | Mod: 25

## 2025-07-19 PROCEDURE — 63600175 PHARM REV CODE 636 W HCPCS: Performed by: REGISTERED NURSE

## 2025-07-19 PROCEDURE — 25000003 PHARM REV CODE 250: Performed by: STUDENT IN AN ORGANIZED HEALTH CARE EDUCATION/TRAINING PROGRAM

## 2025-07-19 RX ORDER — MAGNESIUM SULFATE 1 G/100ML
1 INJECTION INTRAVENOUS ONCE
Status: DISCONTINUED | OUTPATIENT
Start: 2025-07-19 | End: 2025-07-19

## 2025-07-19 RX ORDER — LANOLIN ALCOHOL/MO/W.PET/CERES
1 CREAM (GRAM) TOPICAL DAILY
COMMUNITY

## 2025-07-19 RX ORDER — MEMANTINE HYDROCHLORIDE 10 MG/1
10 TABLET ORAL 2 TIMES DAILY
COMMUNITY
End: 2025-07-19

## 2025-07-19 RX ORDER — TRAZODONE HYDROCHLORIDE 50 MG/1
100 TABLET ORAL NIGHTLY
Status: DISCONTINUED | OUTPATIENT
Start: 2025-07-19 | End: 2025-07-21 | Stop reason: HOSPADM

## 2025-07-19 RX ORDER — DONEPEZIL HYDROCHLORIDE 5 MG/1
5 TABLET, FILM COATED ORAL NIGHTLY
COMMUNITY

## 2025-07-19 RX ORDER — NALOXONE HCL 0.4 MG/ML
0.02 VIAL (ML) INJECTION
Status: DISCONTINUED | OUTPATIENT
Start: 2025-07-19 | End: 2025-07-21 | Stop reason: HOSPADM

## 2025-07-19 RX ORDER — RANOLAZINE 1000 MG/1
1000 TABLET, EXTENDED RELEASE ORAL 2 TIMES DAILY
COMMUNITY

## 2025-07-19 RX ORDER — ENALAPRILAT 1.25 MG/ML
2.5 INJECTION INTRAVENOUS ONCE
Status: COMPLETED | OUTPATIENT
Start: 2025-07-19 | End: 2025-07-19

## 2025-07-19 RX ORDER — INSULIN ASPART 100 [IU]/ML
0-5 INJECTION, SOLUTION INTRAVENOUS; SUBCUTANEOUS
Status: DISCONTINUED | OUTPATIENT
Start: 2025-07-19 | End: 2025-07-21 | Stop reason: HOSPADM

## 2025-07-19 RX ORDER — FLUOXETINE 20 MG/1
20 CAPSULE ORAL DAILY
Status: DISCONTINUED | OUTPATIENT
Start: 2025-07-20 | End: 2025-07-21 | Stop reason: HOSPADM

## 2025-07-19 RX ORDER — FAMOTIDINE 10 MG/ML
20 INJECTION, SOLUTION INTRAVENOUS
Status: COMPLETED | OUTPATIENT
Start: 2025-07-19 | End: 2025-07-19

## 2025-07-19 RX ORDER — NITROGLYCERIN 0.4 MG/1
0.4 TABLET SUBLINGUAL EVERY 5 MIN PRN
Status: DISCONTINUED | OUTPATIENT
Start: 2025-07-19 | End: 2025-07-21 | Stop reason: HOSPADM

## 2025-07-19 RX ORDER — DONEPEZIL HYDROCHLORIDE 5 MG/1
5 TABLET, FILM COATED ORAL NIGHTLY
Status: DISCONTINUED | OUTPATIENT
Start: 2025-07-19 | End: 2025-07-21 | Stop reason: HOSPADM

## 2025-07-19 RX ORDER — IBUPROFEN 200 MG
16 TABLET ORAL
Status: DISCONTINUED | OUTPATIENT
Start: 2025-07-19 | End: 2025-07-21 | Stop reason: HOSPADM

## 2025-07-19 RX ORDER — ONDANSETRON HYDROCHLORIDE 2 MG/ML
4 INJECTION, SOLUTION INTRAVENOUS EVERY 8 HOURS PRN
Status: DISCONTINUED | OUTPATIENT
Start: 2025-07-19 | End: 2025-07-21 | Stop reason: HOSPADM

## 2025-07-19 RX ORDER — HYDRALAZINE HYDROCHLORIDE 20 MG/ML
10 INJECTION INTRAMUSCULAR; INTRAVENOUS EVERY 6 HOURS PRN
Status: DISCONTINUED | OUTPATIENT
Start: 2025-07-19 | End: 2025-07-21 | Stop reason: HOSPADM

## 2025-07-19 RX ORDER — IBUPROFEN 200 MG
24 TABLET ORAL
Status: DISCONTINUED | OUTPATIENT
Start: 2025-07-19 | End: 2025-07-21 | Stop reason: HOSPADM

## 2025-07-19 RX ORDER — SODIUM CHLORIDE 0.9 % (FLUSH) 0.9 %
10 SYRINGE (ML) INJECTION EVERY 12 HOURS PRN
Status: DISCONTINUED | OUTPATIENT
Start: 2025-07-19 | End: 2025-07-21 | Stop reason: HOSPADM

## 2025-07-19 RX ORDER — ATORVASTATIN CALCIUM 10 MG/1
20 TABLET, FILM COATED ORAL NIGHTLY
Status: DISCONTINUED | OUTPATIENT
Start: 2025-07-19 | End: 2025-07-21 | Stop reason: HOSPADM

## 2025-07-19 RX ORDER — GLUCAGON 1 MG
1 KIT INJECTION
Status: DISCONTINUED | OUTPATIENT
Start: 2025-07-19 | End: 2025-07-21 | Stop reason: HOSPADM

## 2025-07-19 RX ORDER — SEMAGLUTIDE 0.68 MG/ML
0.25 INJECTION, SOLUTION SUBCUTANEOUS
COMMUNITY

## 2025-07-19 RX ORDER — AMLODIPINE BESYLATE 5 MG/1
10 TABLET ORAL DAILY
Status: DISCONTINUED | OUTPATIENT
Start: 2025-07-20 | End: 2025-07-21 | Stop reason: HOSPADM

## 2025-07-19 RX ORDER — RANOLAZINE 500 MG/1
1000 TABLET, EXTENDED RELEASE ORAL 2 TIMES DAILY
Status: DISCONTINUED | OUTPATIENT
Start: 2025-07-19 | End: 2025-07-21 | Stop reason: HOSPADM

## 2025-07-19 RX ORDER — NAPROXEN SODIUM 220 MG/1
81 TABLET, FILM COATED ORAL DAILY
Status: DISCONTINUED | OUTPATIENT
Start: 2025-07-20 | End: 2025-07-21 | Stop reason: HOSPADM

## 2025-07-19 RX ORDER — TRAZODONE HYDROCHLORIDE 50 MG/1
100 TABLET ORAL NIGHTLY
COMMUNITY
Start: 2025-06-13

## 2025-07-19 RX ORDER — NAPROXEN SODIUM 220 MG/1
162 TABLET, FILM COATED ORAL
Status: COMPLETED | OUTPATIENT
Start: 2025-07-19 | End: 2025-07-19

## 2025-07-19 RX ADMIN — HYDRALAZINE HYDROCHLORIDE 10 MG: 20 INJECTION INTRAMUSCULAR; INTRAVENOUS at 07:07

## 2025-07-19 RX ADMIN — DONEPEZIL HYDROCHLORIDE 5 MG: 5 TABLET, FILM COATED ORAL at 09:07

## 2025-07-19 RX ADMIN — ATORVASTATIN CALCIUM 20 MG: 10 TABLET, FILM COATED ORAL at 08:07

## 2025-07-19 RX ADMIN — TRAZODONE HYDROCHLORIDE 100 MG: 50 TABLET ORAL at 08:07

## 2025-07-19 RX ADMIN — FAMOTIDINE 20 MG: 10 INJECTION, SOLUTION INTRAVENOUS at 05:07

## 2025-07-19 RX ADMIN — ASPIRIN 81 MG CHEWABLE TABLET 162 MG: 81 TABLET CHEWABLE at 05:07

## 2025-07-19 RX ADMIN — RANOLAZINE 1000 MG: 500 TABLET, FILM COATED, EXTENDED RELEASE ORAL at 08:07

## 2025-07-19 RX ADMIN — ENALAPRILAT 2.5 MG: 2.5 INJECTION INTRAVENOUS at 08:07

## 2025-07-19 NOTE — ED PROVIDER NOTES
"Encounter Date: 7/19/2025       History     Chief Complaint   Patient presents with    Chest Pain     Pt complaining of midsternal chest " throbbing " pain x 1 week. Takes 81mg asa Hx of CAD     HPI    75 yo M w/HTN, HLD, GERD, Alzheimer's dementia (independent w/ADL) , CAD s/p PCI x 7, presenting with midsternal chest pain, episodic, x 1 week.    Denies exertional worsening of the chest pain, reports occurs at rest, reports his last PCI was placed 20 years ago, has not had stress test or LHC in recent years.    Denies pleuritic chest pain, dyspnea, recent illness including cough, fever, vomiting, associated diaphoresis, or nausea with the chest pain.  At times chest pain radiates from substernal region to the left chest.    Denies postprandial worsening or improvement of the chest pain.  Denies orthopnea, leg edema, or calf pain.    Review of patient's allergies indicates:   Allergen Reactions    Penicillins     Contrast media Hives and Itching     Past Medical History:   Diagnosis Date    Cancer     prostate    Diabetes mellitus     Hypertension      History reviewed. No pertinent surgical history.  No family history on file.  Social History[1]  Review of Systems    Physical Exam     Initial Vitals [07/19/25 1600]   BP Pulse Resp Temp SpO2   (!) 145/65 92 18 98.1 °F (36.7 °C) 100 %      MAP       --         Physical Exam    Constitutional: He appears well-developed and well-nourished.   HENT:   Head: Normocephalic and atraumatic.   Eyes: EOM are normal.   Cardiovascular:  Normal rate and regular rhythm.           Pulmonary/Chest: Effort normal and breath sounds normal. No respiratory distress. He has no wheezes. He has no rales.   Abdominal: He exhibits no distension.   Musculoskeletal:         General: No tenderness or edema.      Comments: No calf tenderness bilaterally, no leg edema bilaterally     Neurological: He is alert and oriented to person, place, and time.   A&O x3   Skin: Skin is warm and dry. "   Psychiatric: He has a normal mood and affect.         ED Course   Procedures  Labs Reviewed   COMPREHENSIVE METABOLIC PANEL - Abnormal       Result Value    Sodium 140      Potassium 3.9      Chloride 104      CO2 24      Glucose 173 (*)     BUN 17      Creatinine 1.4      Calcium 8.8      Protein Total 6.7      Albumin 3.7      Bilirubin Total 0.3      ALP 50      AST 24      ALT 28      Anion Gap 12      eGFR 53 (*)    CBC WITH DIFFERENTIAL - Abnormal    WBC 6.36      RBC 4.14 (*)     HGB 11.9 (*)     HCT 33.9 (*)     MCV 82      MCH 28.7      MCHC 35.1      RDW 14.0      Platelet Count 215      MPV 8.9 (*)     Nucleated RBC 0      Neut % 63.8      Lymph % 22.0      Mono % 12.6      Eos % 0.5      Basophil % 0.6      Imm Grans % 0.5      Neut # 4.06      Lymph # 1.40      Mono # 0.80      Eos # 0.03      Baso # 0.04      Imm Grans # 0.03     TROPONIN I - Normal    Troponin-I 0.006     B-TYPE NATRIURETIC PEPTIDE - Normal    BNP 28     MAGNESIUM - Normal    Magnesium  2.0     CBC W/ AUTO DIFFERENTIAL    Narrative:     The following orders were created for panel order CBC auto differential.  Procedure                               Abnormality         Status                     ---------                               -----------         ------                     CBC with Differential[[id number]]       Abnormal            Final result                 Please view results for these tests on the individual orders.   TROPONIN I   URINALYSIS, REFLEX TO URINE CULTURE   TROPONIN I   POCT GLUCOSE MONITORING CONTINUOUS          Imaging Results              X-Ray Chest AP Portable (Final result)  Result time 07/19/25 16:54:27      Final result by Khari Donald MD (07/19/25 16:54:27)                   Impression:      No acute cardiopulmonary process.      Electronically signed by: Khari Donald MD  Date:    07/19/2025  Time:    16:54               Narrative:    EXAMINATION:  XR CHEST AP PORTABLE    CLINICAL  HISTORY:  Chest pain, unspecified    TECHNIQUE:  Portable chest    COMPARISON:  Prior radiographs    FINDINGS:  Cardiac silhouette and mediastinal contours are normal.  Lungs are clear.  Osseous structures are intact.                                       Medications   sodium chloride 0.9% flush 10 mL (has no administration in time range)   naloxone 0.4 mg/mL injection 0.02 mg (has no administration in time range)   glucose chewable tablet 16 g (has no administration in time range)   glucose chewable tablet 24 g (has no administration in time range)   dextrose 50% injection 12.5 g (has no administration in time range)   dextrose 50% injection 25 g (has no administration in time range)   glucagon (human recombinant) injection 1 mg (has no administration in time range)   ondansetron injection 4 mg (has no administration in time range)   insulin aspart U-100 pen 0-5 Units (has no administration in time range)   aspirin chewable tablet 162 mg (162 mg Oral Given 7/19/25 1705)   famotidine (PF) injection 20 mg (20 mg Intravenous Given 7/19/25 1705)       Medical Decision Making  73 yo M w/HTN, HLD, GERD, Alzheimer's dementia (independent w/ADL) , CAD s/p PCI x 7, presenting with midsternal chest pain, episodic, x 1 week.      Differential diagnosis includes but is not limited to: ACS, MSK, arrhythmia, PE, aortic pathology    Patient reports chest pain is minimal currently, with no dyspnea, no pleuritic chest pain, no unilateral leg edema, no PE risk factors, less concern for PE as a cause of his substernal chest pain.    No radiation of pain from the front to the back, with no focal neuro deficits or neuro complaints, less concern for aortic dissection as cause for his chest pain today.    Given his history of CAD, with 7 PCI placed per chart review, no recent stress test or left heart catheterization recently, heart score of 6, plan to place in hospital for observation for cardiac marker trending, and Cardiology  evaluation, possible stress test if indicated.    After review of the patient's physical exam, ED testing, and history/symptoms, the patient requires additional care in the hospital overnight. The hospital medicine service will accept the patient and relevant labs, imaging, or procedures were discussed and they were made aware of any pending labs/imaging/interventions. The diagnosis, treatment and plan were discussed with the patient. All questions and/or concerns have been addressed to the best of my ability.          Amount and/or Complexity of Data Reviewed  Labs: ordered. Decision-making details documented in ED Course.  Radiology: ordered.    Risk  OTC drugs.  Prescription drug management.      Additional MDM:   Heart Score:    History:          Moderately suspicious.  ECG:             Nonspecific repolarisation disturbance  Age:               >65 years  Risk factors: >= 3 risk factors or history of atherosclerotic disease  Troponin:       Less than or equal to normal limit  Heart Score = 6                ED Course as of 07/19/25 1920   Sat Jul 19, 2025   1611 EKG independently interpreted by me with normal sinus rhythm rate of 93, normal axis, subtle ST changes in 1, aVL, and depressions in II, III,  AVF, seen in prior EKG from 2023, likely consistent with LVH, no STEMI meeting criteria, no new Q-waves,  [LF]   1706 Chest x-ray independently interpreted by me with no large consolidation, no cardiomegaly, no pneumothorax [LF]   1716 Troponin I: 0.006 [LF]      ED Course User Index  [LF] Nata Gomes MD                               Clinical Impression:  Final diagnoses:  [R07.9] Chest pain          ED Disposition Condition    Observation                       [1]   Social History  Tobacco Use    Smoking status: Never    Smokeless tobacco: Never        Nata Gomes MD  07/19/25 1920

## 2025-07-19 NOTE — ED TRIAGE NOTES
Patient presents to the ED with c/o midsternal non-radiating aching chest pain that started a week and half ago. Rates pain 1/10  Denies N/V/D. A&O x4 NADN

## 2025-07-20 LAB
ABSOLUTE EOSINOPHIL (OHS): 0.09 K/UL
ABSOLUTE MONOCYTE (OHS): 0.81 K/UL (ref 0.3–1)
ABSOLUTE NEUTROPHIL COUNT (OHS): 3.69 K/UL (ref 1.8–7.7)
ALBUMIN SERPL BCP-MCNC: 3.4 G/DL (ref 3.5–5.2)
ALP SERPL-CCNC: 45 UNIT/L (ref 40–150)
ALT SERPL W/O P-5'-P-CCNC: 22 UNIT/L (ref 10–44)
ANION GAP (OHS): 10 MMOL/L (ref 8–16)
AST SERPL-CCNC: 20 UNIT/L (ref 11–45)
BASOPHILS # BLD AUTO: 0.06 K/UL
BASOPHILS NFR BLD AUTO: 0.9 %
BILIRUB SERPL-MCNC: 0.4 MG/DL (ref 0.1–1)
BILIRUB UR QL STRIP.AUTO: NEGATIVE
BUN SERPL-MCNC: 16 MG/DL (ref 8–23)
CALCIUM SERPL-MCNC: 8.8 MG/DL (ref 8.7–10.5)
CHLORIDE SERPL-SCNC: 105 MMOL/L (ref 95–110)
CLARITY UR: CLEAR
CO2 SERPL-SCNC: 27 MMOL/L (ref 23–29)
COLOR UR AUTO: YELLOW
CREAT SERPL-MCNC: 1.3 MG/DL (ref 0.5–1.4)
ERYTHROCYTE [DISTWIDTH] IN BLOOD BY AUTOMATED COUNT: 13.9 % (ref 11.5–14.5)
GFR SERPLBLD CREATININE-BSD FMLA CKD-EPI: 58 ML/MIN/1.73/M2
GLUCOSE SERPL-MCNC: 119 MG/DL (ref 70–110)
GLUCOSE UR QL STRIP: NEGATIVE
HCT VFR BLD AUTO: 32.3 % (ref 40–54)
HGB BLD-MCNC: 11.3 GM/DL (ref 14–18)
HGB UR QL STRIP: NEGATIVE
IMM GRANULOCYTES # BLD AUTO: 0.04 K/UL (ref 0–0.04)
IMM GRANULOCYTES NFR BLD AUTO: 0.6 % (ref 0–0.5)
KETONES UR QL STRIP: NEGATIVE
LEUKOCYTE ESTERASE UR QL STRIP: NEGATIVE
LYMPHOCYTES # BLD AUTO: 2.06 K/UL (ref 1–4.8)
MCH RBC QN AUTO: 29 PG (ref 27–31)
MCHC RBC AUTO-ENTMCNC: 35 G/DL (ref 32–36)
MCV RBC AUTO: 83 FL (ref 82–98)
NITRITE UR QL STRIP: NEGATIVE
NUCLEATED RBC (/100WBC) (OHS): 0 /100 WBC
PH UR STRIP: 8 [PH]
PLATELET # BLD AUTO: 220 K/UL (ref 150–450)
PMV BLD AUTO: 9.8 FL (ref 9.2–12.9)
POCT GLUCOSE: 104 MG/DL (ref 70–110)
POCT GLUCOSE: 113 MG/DL (ref 70–110)
POCT GLUCOSE: 122 MG/DL (ref 70–110)
POCT GLUCOSE: 128 MG/DL (ref 70–110)
POCT GLUCOSE: 142 MG/DL (ref 70–110)
POCT GLUCOSE: 95 MG/DL (ref 70–110)
POTASSIUM SERPL-SCNC: 3.9 MMOL/L (ref 3.5–5.1)
PROT SERPL-MCNC: 6.2 GM/DL (ref 6–8.4)
PROT UR QL STRIP: NEGATIVE
RBC # BLD AUTO: 3.89 M/UL (ref 4.6–6.2)
RELATIVE EOSINOPHIL (OHS): 1.3 %
RELATIVE LYMPHOCYTE (OHS): 30.5 % (ref 18–48)
RELATIVE MONOCYTE (OHS): 12 % (ref 4–15)
RELATIVE NEUTROPHIL (OHS): 54.7 % (ref 38–73)
SODIUM SERPL-SCNC: 142 MMOL/L (ref 136–145)
SP GR UR STRIP: 1.01
UROBILINOGEN UR STRIP-ACNC: NEGATIVE EU/DL
WBC # BLD AUTO: 6.75 K/UL (ref 3.9–12.7)

## 2025-07-20 PROCEDURE — 81003 URINALYSIS AUTO W/O SCOPE: CPT | Performed by: STUDENT IN AN ORGANIZED HEALTH CARE EDUCATION/TRAINING PROGRAM

## 2025-07-20 PROCEDURE — 36415 COLL VENOUS BLD VENIPUNCTURE: CPT | Performed by: REGISTERED NURSE

## 2025-07-20 PROCEDURE — 85025 COMPLETE CBC W/AUTO DIFF WBC: CPT | Performed by: REGISTERED NURSE

## 2025-07-20 PROCEDURE — 82040 ASSAY OF SERUM ALBUMIN: CPT | Performed by: REGISTERED NURSE

## 2025-07-20 PROCEDURE — G0378 HOSPITAL OBSERVATION PER HR: HCPCS

## 2025-07-20 PROCEDURE — 99204 OFFICE O/P NEW MOD 45 MIN: CPT | Mod: ,,, | Performed by: INTERNAL MEDICINE

## 2025-07-20 PROCEDURE — 94761 N-INVAS EAR/PLS OXIMETRY MLT: CPT

## 2025-07-20 PROCEDURE — 99900035 HC TECH TIME PER 15 MIN (STAT)

## 2025-07-20 PROCEDURE — 25000003 PHARM REV CODE 250: Performed by: REGISTERED NURSE

## 2025-07-20 RX ADMIN — ATORVASTATIN CALCIUM 20 MG: 10 TABLET, FILM COATED ORAL at 08:07

## 2025-07-20 RX ADMIN — TRAZODONE HYDROCHLORIDE 100 MG: 50 TABLET ORAL at 08:07

## 2025-07-20 RX ADMIN — RANOLAZINE 1000 MG: 500 TABLET, FILM COATED, EXTENDED RELEASE ORAL at 08:07

## 2025-07-20 RX ADMIN — AMLODIPINE BESYLATE 10 MG: 5 TABLET ORAL at 08:07

## 2025-07-20 RX ADMIN — ASPIRIN 81 MG CHEWABLE TABLET 81 MG: 81 TABLET CHEWABLE at 08:07

## 2025-07-20 RX ADMIN — DONEPEZIL HYDROCHLORIDE 5 MG: 5 TABLET, FILM COATED ORAL at 08:07

## 2025-07-20 RX ADMIN — FLUOXETINE HYDROCHLORIDE 20 MG: 20 CAPSULE ORAL at 08:07

## 2025-07-20 NOTE — ASSESSMENT & PLAN NOTE
Patient's blood pressure range in the last 24 hours was: BP  Min: 145/65  Max: 219/98.The patient's inpatient anti-hypertensive regimen is listed below:  Current Antihypertensives  , 2 times daily, Oral  hydrALAZINE injection 10 mg, Every 6 hours PRN, Intravenous  amLODIPine tablet 10 mg, Daily, Oral  ranolazine 12 hr tablet 1,000 mg, 2 times daily, Oral  nitroGLYCERIN SL tablet 0.4 mg, Every 5 min PRN, Sublingual    Plan  - BP is uncontrolled, will adjust as follows: continue home meds add PRN hydralazine and  single dose vasotec overnight  -

## 2025-07-20 NOTE — ASSESSMENT & PLAN NOTE
7/20:  Cards consul pending, appreciate recs.       7/19: Tropo negative x3..  PCIx7  No ST elevation. Depression in leads II, III, avf unchanged from previous EKG  CP subsided  HEART score 6    Consult cards  Continue asa daily.  Pt unsure if on plavix at home

## 2025-07-20 NOTE — HPI
Patient is a pleasant 74-year-old man with past medical history significant for hypertension  dyslipidemia coronary artery disease status post multiple PCIs.  Came in with chest pains.  States that pain was different than his prior anginal pain.  Currently chest pain-free.          HPI:  Patient is a 74-year-old male with a history of HTN, HLD, GERD, Alzheimer's dementia, CAD PCI x7 presented to ED with midsternal chest pain which he reports has been intermittent over the last week.  Denies any radiating chest pain, shortness for breath, nausea, syncope, weakness.  EKG sinus rhythm 93 no ST-elevation, depressions in II, III, AVF, seen in prior EKG from 2023.  Denies any active chest pain.  BNP and troponin both negative , patient will be admitted to Hospital Medicine with a cardiology consult given his cardiac history.     Overview/Hospital Course:  Mr. Mei was placed in observation for ACS rule out after presenting with chest pain.  EKG without evidence of acute ischemia, troponin negative x3, currently pain free.  Workup without evidence of other acute abnormality.  Cardiology consult, appreciate recs.

## 2025-07-20 NOTE — ASSESSMENT & PLAN NOTE
Patient with known CAD s/p stent placement, which is uncontrolled Will continue ASA and Statin and monitor for S/Sx of angina/ACS. Continue to monitor on telemetry.

## 2025-07-20 NOTE — ASSESSMENT & PLAN NOTE
Tropo negative x3..  PCIx7  No ST elevation. Depression in leads II, III, avf unchanged from previous EKG  CP subsided  HEART score 6    Consult cards  Continue asa daily.  Pt unsure if on plavix at home

## 2025-07-20 NOTE — PROGRESS NOTES
[facility]  Progress Note    Patient Name: Artie Mei  MRN: [id number]  Patient Class: OP- Observation   Admission Date: 7/19/2025  Length of Stay: 0 days  Attending Physician: Celia Faustin MD  Primary Care Provider: Kp Kwong MD        Subjective     Principal Problem:Chest pain        HPI:  Patient is a 74-year-old male with a history of HTN, HLD, GERD, Alzheimer's dementia, CAD PCI x7 presented to ED with midsternal chest pain which he reports has been intermittent over the last week.  Denies any radiating chest pain, shortness for breath, nausea, syncope, weakness.  EKG sinus rhythm 93 no ST-elevation, depressions in II, III, AVF, seen in prior EKG from 2023.  Denies any active chest pain.  BNP and troponin both negative , patient will be admitted to Hospital Medicine with a cardiology consult given his cardiac history.    Overview/Hospital Course:  Mr. Mei was placed in observation for ACS rule out after presenting with chest pain.  EKG without evidence of acute ischemia, troponin negative x3, currently pain free.  Workup without evidence of other acute abnormality.  Cardiology consult, appreciate recs.     Interval History: no new complaint    Review of Systems   Cardiovascular:  Positive for chest pain (resolved).   All other systems reviewed and are negative.    Objective:     Vital Signs (Most Recent):  Temp: 98 °F (36.7 °C) (07/20/25 0736)  Pulse: 70 (07/20/25 0758)  Resp: 18 (07/20/25 0736)  BP: (!) 151/80 (07/20/25 0736)  SpO2: 99 % (07/20/25 0736) Vital Signs (24h Range):  Temp:  [97.7 °F (36.5 °C)-98.1 °F (36.7 °C)] 98 °F (36.7 °C)  Pulse:  [64-92] 70  Resp:  [17-22] 18  SpO2:  [98 %-100 %] 99 %  BP: (125-219)/(65-98) 151/80     Weight: 77.6 kg (171 lb 1.2 oz)  Body mass index is 23.2 kg/m².    Intake/Output Summary (Last 24 hours) at 7/20/2025 1125  Last data filed at 7/20/2025 0736  Gross per 24 hour   Intake 240 ml   Output --   Net 240 ml          Physical Exam  Vitals and nursing note reviewed.   Constitutional:       General: He is not in acute distress.     Appearance: He is well-developed.   HENT:      Head: Normocephalic and atraumatic.      Right Ear: External ear normal.      Left Ear: External ear normal.   Cardiovascular:      Rate and Rhythm: Normal rate and regular rhythm.   Pulmonary:      Effort: Pulmonary effort is normal. No respiratory distress.   Skin:     General: Skin is warm and dry.   Neurological:      Mental Status: He is alert and oriented to person, place, and time.   Psychiatric:         Thought Content: Thought content normal.               Significant Labs: All pertinent labs within the past 24 hours have been reviewed.    Significant Imaging: I have reviewed all pertinent imaging results/findings within the past 24 hours.      Assessment & Plan  Chest pain  7/20:  Cards consul pending, appreciate recs.       7/19: Tropo negative x3..  PCIx7  No ST elevation. Depression in leads II, III, avf unchanged from previous EKG  CP subsided  HEART score 6    Consult cards  Continue asa daily.  Pt unsure if on plavix at home    HTN (hypertension)  Patient's blood pressure range in the last 24 hours was: BP  Min: 125/71  Max: 219/98.The patient's inpatient anti-hypertensive regimen is listed below:  Current Antihypertensives  , 2 times daily, Oral  hydrALAZINE injection 10 mg, Every 6 hours PRN, Intravenous  amLODIPine tablet 10 mg, Daily, Oral  ranolazine 12 hr tablet 1,000 mg, 2 times daily, Oral  nitroGLYCERIN SL tablet 0.4 mg, Every 5 min PRN, Sublingual    Plan  - BP is uncontrolled, will adjust as follows: continue home meds add PRN hydralazine and  single dose vasotec overnight  -   CAD (coronary artery disease)  Patient with known CAD s/p stent placement, which is uncontrolled Will continue ASA and Statin and monitor for S/Sx of angina/ACS. Continue to monitor on telemetry.   Neurodegenerative dementia with anxiety  Patient with  dementia with likely etiology of alzheimer's dementia. Dementia is moderate. The patient does not have signs of behavioral disturbance. Home dementia medications are Held or Continued: continued.. Continue non-pharmacologic interventions to prevent delirium (No VS between 11PM-5AM, activity during day, opening blinds, providing glasses/hearing aids, and up in chair during daytime). Will avoid narcotics and benzos unless absolutely necessary. PRN anti-psychotics are not prescribed to avoid self harm behaviors.  VTE Risk Mitigation (From admission, onward)           Ordered     Reason for No Pharmacological VTE Prophylaxis  Once        Question:  Reasons:  Answer:  Patient is Ambulatory    07/19/25 1833     IP VTE HIGH RISK PATIENT  Once         07/19/25 1833     Place sequential compression device  Until discontinued         07/19/25 1833                    Discharge Planning   BRIANA:      Code Status: Full Code   Medical Readiness for Discharge Date:   Discharge Plan A: Home                        Hardy Box Jr, NP  Department of Hospital Medicine   [facility] - Counts include 234 beds at the [facility]

## 2025-07-20 NOTE — HOSPITAL COURSE
74-year-old male with history of HTN, HLD, GERD, Alzheimers dementia, and CAD s/p PCI x7 presented with intermittent midsternal chest pain over the past week. No associated shortness of breath, nausea, syncope, or radiation. EKG showed sinus rhythm at 93 bpm with ST depressions in II, III, and aVF, unchanged from prior EKG from 2023. BNP and serial troponins were negative. Patient remained chest pain free during admission.   He was placed in observation for ACS rule out. Cardiology was consulted. Regadenoson stress test with myocardial perfusion SPECT showed no evidence of ischemia or infarction. Ejection fraction was 68% with normal wall motion at rest and post-stress. Fixed inferior wall defect attributed to diaphragmatic attenuation. ECG during the test was negative for ischemia. Rare PVCs were noted and patient reported chest pain during the stress portion.  He remained hemodynamically stable with no recurrent symptoms or new concerning findings. Patient is medically stable for discharge with outpatient cardiology follow-up.

## 2025-07-20 NOTE — SUBJECTIVE & OBJECTIVE
Past Medical History:   Diagnosis Date    Cancer     prostate    Diabetes mellitus     Hypertension        History reviewed. No pertinent surgical history.    Review of patient's allergies indicates:   Allergen Reactions    Penicillins     Contrast media Hives and Itching       No current facility-administered medications on file prior to encounter.     Current Outpatient Medications on File Prior to Encounter   Medication Sig    atorvastatin (LIPITOR) 20 MG tablet Take 20 mg by mouth every evening.    cyanocobalamin, vitamin B-12, (VITAMIN B-12) 50 mcg tablet Take 50 mcg by mouth once daily.    docusate sodium (STOOL SOFTENER ORAL) Take by mouth every evening.    donepeziL (ARICEPT) 5 MG tablet Take 5 mg by mouth every evening.    ferrous sulfate (FEOSOL) Tab tablet Take 1 tablet by mouth once daily.    FLUoxetine 20 MG capsule Take 20 mg by mouth once daily.    mv-min/folic/K1/lycopen/lutein (CENTRUM SILVER MEN ORAL) Take by mouth.    OZEMPIC 0.25 mg or 0.5 mg (2 mg/3 mL) pen injector Inject 0.25 mg into the skin every 7 days.    ranolazine (RANEXA) 1,000 mg Tb12 Take 1,000 mg by mouth 2 (two) times daily.    traZODone (DESYREL) 50 MG tablet Take 100 mg by mouth every evening.    amlodipine-benazepril 10-20mg (LOTREL) 10-20 mg per capsule Take 1 capsule by mouth once daily. (Patient not taking: Reported on 7/19/2025)    nitroGLYCERIN (NITROSTAT) 0.4 MG SL tablet Place 1 tablet (0.4 mg total) under the tongue every 5 (five) minutes as needed for Chest pain. Place 1 under tongue if you have Chest Pain every 5 minutes until pain resolves; if you take 3 and pain has not resolved, please come to the Emergency Room.     Family History    None       Tobacco Use    Smoking status: Never    Smokeless tobacco: Never   Substance and Sexual Activity    Alcohol use: Not on file    Drug use: Not on file    Sexual activity: Not on file     Review of Systems   Constitutional: Negative.   HENT: Negative.     Eyes: Negative.     Cardiovascular:  Positive for chest pain.   Respiratory: Negative.     Endocrine: Negative.    Hematologic/Lymphatic: Negative.    Skin: Negative.    Musculoskeletal: Negative.    Gastrointestinal: Negative.    Genitourinary: Negative.    Neurological: Negative.    Psychiatric/Behavioral: Negative.     Allergic/Immunologic: Negative.      Objective:     Vital Signs (Most Recent):  Temp: 97.9 °F (36.6 °C) (07/20/25 1141)  Pulse: 72 (07/20/25 1226)  Resp: 19 (07/20/25 1141)  BP: 138/78 (07/20/25 1141)  SpO2: 100 % (07/20/25 1141) Vital Signs (24h Range):  Temp:  [97.7 °F (36.5 °C)-98.1 °F (36.7 °C)] 97.9 °F (36.6 °C)  Pulse:  [64-92] 72  Resp:  [17-22] 19  SpO2:  [98 %-100 %] 100 %  BP: (125-219)/(65-98) 138/78     Weight: 77.6 kg (171 lb 1.2 oz)  Body mass index is 23.2 kg/m².    SpO2: 100 %         Intake/Output Summary (Last 24 hours) at 7/20/2025 1502  Last data filed at 7/20/2025 1256  Gross per 24 hour   Intake 480 ml   Output --   Net 480 ml       Lines/Drains/Airways       Peripheral Intravenous Line  Duration             Peripheral IV 07/19/25 1705 20 G Right Antecubital <1 day                     Physical Exam  Vitals reviewed.   Constitutional:       Appearance: He is well-developed.   HENT:      Head: Normocephalic.   Eyes:      Conjunctiva/sclera: Conjunctivae normal.      Pupils: Pupils are equal, round, and reactive to light.   Cardiovascular:      Rate and Rhythm: Normal rate and regular rhythm.      Heart sounds: Normal heart sounds.   Pulmonary:      Effort: Pulmonary effort is normal.      Breath sounds: Normal breath sounds.   Abdominal:      General: Bowel sounds are normal.      Palpations: Abdomen is soft.   Musculoskeletal:      Cervical back: Normal range of motion and neck supple.   Skin:     General: Skin is warm.   Neurological:      Mental Status: He is alert and oriented to person, place, and time.          Significant Labs:     Laboratory:  CBC:  Recent Labs   Lab 07/20/23  1023  07/19/25 1639 07/20/25  0428   WBC 6.03 6.36 6.75   Hemoglobin 12.9 L  --   --    HGB  --  11.9 L 11.3 L   Hematocrit 35.5 L  --   --    HCT  --  33.9 L 32.3 L   Platelet Count  --  215 220   Platelets 262  --   --        CHEMISTRIES:  Recent Labs   Lab 07/20/23  1023 09/27/24  1555 09/30/24  1417 07/19/25 1639 07/20/25  0428   Glucose 130 H  --   --  173 H 119 H   Sodium 139   < > 132 L 140 142   Potassium 4.0   < > 4.0 3.9 3.9   BUN 15   < > 8.7 L 17 16   Creatinine 1.3   < > 1.31 H 1.4 1.3   Calcium 9.7   < > 8.7 8.8 8.8   Magnesium   --   --   --  2.0  --    Magnesium 1.7  --   --   --   --     < > = values in this interval not displayed.       CARDIAC BIOMARKERS:  Recent Labs   Lab 09/27/24  1555 07/19/25  1639 07/19/25 1939 07/19/25 2036   CK 85  --   --   --    Troponin-I  --  0.006 <0.006 <0.006       COAGS:  Recent Labs   Lab 09/27/24  1555   INR 1.0       LIPIDS/LFTS:  Recent Labs   Lab 01/13/23 2025 07/20/23  1023 09/30/24  1417 07/19/25 1639 07/20/25 0428   Cholesterol 167  --   --   --   --    Triglycerides 54  --   --   --   --    HDL 76 H  --   --   --   --    LDL Cholesterol 80.2  --   --   --   --    Non-HDL Cholesterol 91  --   --   --   --    AST  --    < > 33 24 20   ALT  --    < > 24 28 22    < > = values in this interval not displayed.       Hemoglobin A1C   Date Value Ref Range Status   01/13/2023 6.1 (H) 4.0 - 5.6 % Final     Comment:     ADA Screening Guidelines:  5.7-6.4%  Consistent with prediabetes  >or=6.5%  Consistent with diabetes    High levels of fetal hemoglobin interfere with the HbA1C  assay. Heterozygous hemoglobin variants (HbS, HgC, etc)do  not significantly interfere with this assay.   However, presence of multiple variants may affect accuracy.         TSH  Recent Labs   Lab 01/13/23 2025 07/20/23  1023   TSH 0.469 0.794       The 10-year ASCVD risk score (Anselmo CASTRO, et al., 2019) is: 20.1%    Values used to calculate the score:      Age: 74 years      Sex: Male       Is Non- : Yes      Diabetic: No      Tobacco smoker: No      Systolic Blood Pressure: 138 mmHg      Is BP treated: Yes      HDL Cholesterol: 76 mg/dL      Total Cholesterol: 167 mg/dL       BNP    Lab Results   Component Value Date/Time    BNP 28 07/19/2025 04:39 PM    BNP 23 01/13/2023 05:43 PM            ECHO    Results for orders placed during the hospital encounter of 01/13/23    Echo    Interpretation Summary  · The left ventricle is normal in size with moderate concentric hypertrophy and normal systolic function.  · The estimated ejection fraction is 70%.  · Normal right ventricular size with normal right ventricular systolic function.  · The estimated PA systolic pressure is 22 mmHg.            Medications Discontinued During This Encounter   Medication Reason    magnesium sulfate in dextrose IVPB (premix) 1 g     memantine (NAMENDA) 5 MG Tab Dose adjustment    levocetirizine (XYZAL) 5 MG tablet Patient no longer taking    memantine (NAMENDA) 10 MG Tab Patient no longer taking    ranolazine (RANEXA) 500 MG Tb12 Dose adjustment    chlorproMAZINE (THORAZINE) 25 MG tablet Patient no longer taking    carvediloL (COREG) 12.5 MG tablet Patient no longer taking    omeprazole (PRILOSEC) 20 MG capsule Patient no longer taking         DATA:

## 2025-07-20 NOTE — PLAN OF CARE
Problem: Adult Inpatient Plan of Care  Goal: Plan of Care Review  Outcome: Ongoing     VIRTUAL NURSE:  Cued into patient's room.  Permission received per patient to turn camera.  Introduced as VN for night shift that will be working with floor nurse and nursing assistant.  Educated on VN's role in patient care.  Plan of care reviewed.  Education per flowsheet.   Informed that staff will round on patient every 2 hours but to use call light for any other needs they may have.  Informed of fall risk and fall precautions; verbalized understanding.  Call light within reach; bed siderails up x2.  Opportunity given for questions and questions answered.  Admission assessment questions answered.  Denies complaints or any needs at this time.  Instructed to call for assistance.  Will cont to monitor and intervene as needed.    Labs, notes, orders, and careplan initiated.       07/20/25 0012   Admission Complete   Admission Complete by VN Complete      07/20/25 0012   Patient Request   Patient Requested no complaints or needs currently   Admission   Initial VN Admission Questions Complete   Communication Issues? None   Shift   Virtual Nurse - Rounding Complete   Pain Management Interventions pain management plan reviewed with patient/caregiver   Virtual Nurse - Patient Verbalized Approval Of Camera Use;VN Rounding   Type of Frequent Check   Type Patient Rounds   Safety/Activity   Patient Rounds bed in low position;placement of personal items at bedside;bed wheels locked;call light in patient/parent reach;visualized patient;clutter free environment maintained   Safety Promotion/Fall Prevention assistive device/personal item within reach;commode/urinal/bedpan at bedside;high risk medications identified;Fall Risk reviewed with patient/family;diversional activities provided;medications reviewed;nonskid shoes/socks when out of bed;room near unit station;supervised activity;instructed to call staff for mobility;side rails raised x 2    Safety Precautions emergency equipment at bedside   Positioning   Body Position neutral body alignment;neutral head position;position changed independently   Head of Bed (HOB) Positioning HOB at 60-90 degrees   Pain/Comfort/Sleep   Preferred Pain Scale number (Numeric Rating Pain Scale)   Comfort/Acceptable Pain Level 0   Pain Rating (0-10): Rest 0   Sleep/Rest/Relaxation awake

## 2025-07-20 NOTE — HPI
Patient is a 74-year-old male with a history of HTN, HLD, GERD, Alzheimer's dementia, CAD PCI x7 presented to ED with midsternal chest pain which he reports has been intermittent over the last week.  Denies any radiating chest pain, shortness for breath, nausea, syncope, weakness.  EKG sinus rhythm 93 no ST-elevation, depressions in II, III, AVF, seen in prior EKG from 2023.  Denies any active chest pain.  BNP and troponin both negative , patient will be admitted to Hospital Medicine with a cardiology consult given his cardiac history.

## 2025-07-20 NOTE — ASSESSMENT & PLAN NOTE
In patient with multiple risk factors for coronary artery disease.   Troponins negative.  Check stress test in a.m..

## 2025-07-20 NOTE — PLAN OF CARE
Problem: Adult Inpatient Plan of Care  Goal: Plan of Care Review  Outcome: Progressing  Goal: Patient-Specific Goal (Individualized)  Outcome: Progressing  Goal: Absence of Hospital-Acquired Illness or Injury  Outcome: Progressing  Goal: Optimal Comfort and Wellbeing  Outcome: Progressing  Goal: Readiness for Transition of Care  Outcome: Progressing     Problem: Diabetes Comorbidity  Goal: Blood Glucose Level Within Targeted Range  Outcome: Progressing     Problem: Fall Injury Risk  Goal: Absence of Fall and Fall-Related Injury  Outcome: Progressing     Problem: Comorbidity Management  Goal: Blood Pressure in Desired Range  Outcome: Progressing     Problem: Chest Pain  Goal: Resolution of Chest Pain Symptoms  Outcome: Progressing

## 2025-07-20 NOTE — H&P
"  [facility] Medicine  History & Physical    Patient Name: Artie Mei  MRN: [id number]  Patient Class: OP- Observation  Admission Date: 7/19/2025  Attending Physician: Celia Faustin MD   Primary Care Provider: Kp Kwong MD         Patient information was obtained from patient and ER records.     Subjective:     Principal Problem:<principal problem not specified>    Chief Complaint:   Chief Complaint   Patient presents with    Chest Pain     Pt complaining of midsternal chest " throbbing " pain x 1 week. Takes 81mg asa Hx of CAD        HPI: Patient is a 74-year-old male with a history of HTN, HLD, GERD, Alzheimer's dementia, CAD PCI x7 presented to ED with midsternal chest pain which he reports has been intermittent over the last week.  Denies any radiating chest pain, shortness for breath, nausea, syncope, weakness.  EKG sinus rhythm 93 no ST-elevation, depressions in II, III, AVF, seen in prior EKG from 2023.  Denies any active chest pain.  BNP and troponin both negative , patient will be admitted to Hospital Medicine with a cardiology consult given his cardiac history.    Past Medical History:   Diagnosis Date    Cancer     prostate    Diabetes mellitus     Hypertension        History reviewed. No pertinent surgical history.    Review of patient's allergies indicates:   Allergen Reactions    Penicillins     Contrast media Hives and Itching       No current facility-administered medications on file prior to encounter.     Current Outpatient Medications on File Prior to Encounter   Medication Sig    atorvastatin (LIPITOR) 20 MG tablet Take 20 mg by mouth every evening.    cyanocobalamin, vitamin B-12, (VITAMIN B-12) 50 mcg tablet Take 50 mcg by mouth once daily.    docusate sodium (STOOL SOFTENER ORAL) Take by mouth every evening.    donepeziL (ARICEPT) 5 MG tablet Take 5 mg by mouth every evening.    ferrous sulfate (FEOSOL) Tab tablet Take 1 tablet by mouth once daily.    " FLUoxetine 20 MG capsule Take 20 mg by mouth once daily.    mv-min/folic/K1/lycopen/lutein (CENTRUM SILVER MEN ORAL) Take by mouth.    OZEMPIC 0.25 mg or 0.5 mg (2 mg/3 mL) pen injector Inject 0.25 mg into the skin every 7 days.    ranolazine (RANEXA) 1,000 mg Tb12 Take 1,000 mg by mouth 2 (two) times daily.    traZODone (DESYREL) 50 MG tablet Take 100 mg by mouth every evening.    [DISCONTINUED] memantine (NAMENDA) 5 MG Tab Take 5 mg by mouth 2 (two) times daily.    [DISCONTINUED] ranolazine (RANEXA) 500 MG Tb12 Take 500 mg by mouth 2 (two) times daily.    amlodipine-benazepril 10-20mg (LOTREL) 10-20 mg per capsule Take 1 capsule by mouth once daily. (Patient not taking: Reported on 7/19/2025)    nitroGLYCERIN (NITROSTAT) 0.4 MG SL tablet Place 1 tablet (0.4 mg total) under the tongue every 5 (five) minutes as needed for Chest pain. Place 1 under tongue if you have Chest Pain every 5 minutes until pain resolves; if you take 3 and pain has not resolved, please come to the Emergency Room.    [DISCONTINUED] carvediloL (COREG) 12.5 MG tablet Take 12.5 mg by mouth 2 (two) times daily with meals.    [DISCONTINUED] chlorproMAZINE (THORAZINE) 25 MG tablet Take 1 tablet (25 mg total) by mouth once as needed (hiccups).    [DISCONTINUED] levocetirizine (XYZAL) 5 MG tablet Take 5 mg by mouth every evening.    [DISCONTINUED] memantine (NAMENDA) 10 MG Tab Take 10 mg by mouth 2 (two) times daily.    [DISCONTINUED] omeprazole (PRILOSEC) 20 MG capsule Take 1 capsule (20 mg total) by mouth once daily.     Family History    None       Tobacco Use    Smoking status: Never    Smokeless tobacco: Never   Substance and Sexual Activity    Alcohol use: Not on file    Drug use: Not on file    Sexual activity: Not on file     Review of Systems   Cardiovascular:  Positive for chest pain (resolved).   All other systems reviewed and are negative.    Objective:     Vital Signs (Most Recent):  Temp: 97.8 °F (36.6 °C) (07/19/25 2433)  Pulse: 77  (07/19/25 2318)  Resp: 19 (07/19/25 2150)  BP: (!) 168/85 (07/19/25 2150)  SpO2: 98 % (07/19/25 2150) Vital Signs (24h Range):  Temp:  [97.8 °F (36.6 °C)-98.1 °F (36.7 °C)] 97.8 °F (36.6 °C)  Pulse:  [64-92] 77  Resp:  [18-22] 19  SpO2:  [98 %-100 %] 98 %  BP: (145-219)/(65-98) 168/85     Weight: 77.6 kg (171 lb 1.2 oz)  Body mass index is 23.2 kg/m².     Physical Exam  Vitals reviewed.   Constitutional:       Appearance: Normal appearance.   HENT:      Head: Normocephalic.   Eyes:      Pupils: Pupils are equal, round, and reactive to light.   Cardiovascular:      Rate and Rhythm: Normal rate and regular rhythm.      Pulses: Normal pulses.      Heart sounds: Normal heart sounds.   Pulmonary:      Effort: Pulmonary effort is normal.      Breath sounds: Normal breath sounds.   Abdominal:      General: Bowel sounds are normal.      Palpations: Abdomen is soft.   Musculoskeletal:         General: Normal range of motion.      Cervical back: Normal range of motion.   Skin:     General: Skin is warm and dry.      Capillary Refill: Capillary refill takes less than 2 seconds.   Neurological:      General: No focal deficit present.      Mental Status: He is alert and oriented to person, place, and time. Mental status is at baseline.      Comments: forgetful              CRANIAL NERVES     CN III, IV, VI   Pupils are equal, round, and reactive to light.       Significant Labs: All pertinent labs within the past 24 hours have been reviewed.  Recent Lab Results         07/19/25 2036 07/19/25 1939 07/19/25 1938 07/19/25  1639        Albumin       3.7       ALP       50       ALT       28       Anion Gap       12       AST       24       Baso #       0.04       Basophil %       0.6       BILIRUBIN TOTAL       0.3  Comment: For infants and newborns, interpretation of results should be based   on gestational age, weight and in agreement with clinical   observations.    Premature Infant recommended reference ranges:   0-24  hours:  <8.0 mg/dL   24-48 hours: <12.0 mg/dL   3-5 days:    <15.0 mg/dL   6-29 days:   <15.0 mg/dL       BNP       28  Comment: Values of less than 100 pg/ml are consistent with non-CHF populations.        BUN       17       Calcium       8.8       Chloride       104       CO2       24       Creatinine       1.4       eGFR       53  Comment: Estimated GFR calculated using the CKD-EPI creatinine (2021) equation.       Eos #       0.03       Eos %       0.5       Glucose       173       Gran # (ANC)       4.06       Hematocrit       33.9       Hemoglobin       11.9       Immature Grans (Abs)       0.03  Comment: Mild elevation in immature granulocytes is non specific and can be seen in a variety of conditions including stress response, acute inflammation, trauma and pregnancy. Correlation with other laboratory and clinical findings is essential.       Immature Granulocytes       0.5       Lymph #       1.40       Lymph %       22.0       Magnesium        2.0       MCH       28.7       MCHC       35.1       MCV       82       Mono #       0.80       Mono %       12.6       MPV       8.9       Neut %       63.8       nRBC       0       Platelet Count       215       POCT Glucose     91         Potassium       3.9       PROTEIN TOTAL       6.7       RBC       4.14       RDW       14.0       Sodium       140       Troponin I <0.006  Comment: The reference interval for Troponin I represents the 99th percentile cutoff for our facility and is consistent with 3rd generation assay performance.   <0.006  Comment: The reference interval for Troponin I represents the 99th percentile cutoff for our facility and is consistent with 3rd generation assay performance.     0.006  Comment: The reference interval for Troponin I represents the 99th percentile cutoff for our facility and is consistent with 3rd generation assay performance.       WBC       6.36               Significant Imaging: I have reviewed all pertinent imaging  results/findings within the past 24 hours.  I have reviewed and interpreted all pertinent imaging results/findings within the past 24 hours.  Assessment/Plan:     Assessment & Plan  HTN (hypertension)  Patient's blood pressure range in the last 24 hours was: BP  Min: 145/65  Max: 219/98.The patient's inpatient anti-hypertensive regimen is listed below:  Current Antihypertensives  , 2 times daily, Oral  hydrALAZINE injection 10 mg, Every 6 hours PRN, Intravenous  amLODIPine tablet 10 mg, Daily, Oral  ranolazine 12 hr tablet 1,000 mg, 2 times daily, Oral  nitroGLYCERIN SL tablet 0.4 mg, Every 5 min PRN, Sublingual    Plan  - BP is uncontrolled, will adjust as follows: continue home meds add PRN hydralazine and  single dose vasotec overnight  -   Chest pain  Tropo negative x3..  PCIx7  No ST elevation. Depression in leads II, III, avf unchanged from previous EKG  CP subsided  HEART score 6    Consult cards  Continue asa daily.  Pt unsure if on plavix at home    CAD (coronary artery disease)  Patient with known CAD s/p stent placement, which is uncontrolled Will continue ASA and Statin and monitor for S/Sx of angina/ACS. Continue to monitor on telemetry.   Neurodegenerative dementia with anxiety  Patient with dementia with likely etiology of alzheimer's dementia. Dementia is moderate. The patient does not have signs of behavioral disturbance. Home dementia medications are Held or Continued: continued.. Continue non-pharmacologic interventions to prevent delirium (No VS between 11PM-5AM, activity during day, opening blinds, providing glasses/hearing aids, and up in chair during daytime). Will avoid narcotics and benzos unless absolutely necessary. PRN anti-psychotics are not prescribed to avoid self harm behaviors.  VTE Risk Mitigation (From admission, onward)           Ordered     Reason for No Pharmacological VTE Prophylaxis  Once        Question:  Reasons:  Answer:  Patient is Ambulatory    07/19/25 6713     IP VTE  HIGH RISK PATIENT  Once         07/19/25 1833     Place sequential compression device  Until discontinued         07/19/25 1833                                     On 07/19/2025, patient should be placed in hospital observation services under my care in collaboration with Dr Faustin.           Khari Johnson NP  Department of Hospital Medicine  [facility] [place] - Telemetry

## 2025-07-20 NOTE — PLAN OF CARE
Case Management Assessment     PCP: Kp Kwong MD    Pharmacy:   [facility] - [place], [place] - [address]  [address]  [address]  [facility] [address]  Phone: [phone] Fax: [phone]    [id number] - [place], [place] - [address]  [address] [place] [address]  Phone: [phone] Fax: [phone]    Patient Arrived From: Home  Existing Help at Home: pt is independent, daughter assists as needed    Barriers to Discharge: None identified    Discharge Plan:    A. Home   B. Home    CM met with pt for dc planning assessment. Pt lives alone. He's independent with ADLs and uses no DME. His daughter will provide transportation home on discharge. CM will continue to follow.     07/20/25 7518   Discharge Assessment   Assessment Type Discharge Planning Assessment   Confirmed/corrected address, phone number and insurance Yes   Confirmed Demographics Correct on Facesheet   Source of Information patient   People in Home alone   Do you expect to return to your current living situation? Yes   Current cognitive status: Alert/Oriented   Walking or Climbing Stairs Difficulty no   Dressing/Bathing Difficulty no   Equipment Currently Used at Home none   Readmission within 30 days? No   Patient currently being followed by outpatient case management? No   Do you currently have service(s) that help you manage your care at home? No   Do you take prescription medications? Yes   Do you have prescription coverage? Yes   Do you have any problems affording any of your prescribed medications? No   Is the patient taking medications as prescribed? yes   Who is going to help you get home at discharge? daughter   How do you get to doctors appointments? family or friend will provide   Are you on dialysis? No   Do you take coumadin? No   Discharge Plan A Home   Discharge Plan B Home   DME Needed Upon Discharge  none   Discharge Plan discussed with: Patient   Transition of Care  Barriers None

## 2025-07-20 NOTE — SUBJECTIVE & OBJECTIVE
Interval History: no new complaint    Review of Systems   Cardiovascular:  Positive for chest pain (resolved).   All other systems reviewed and are negative.    Objective:     Vital Signs (Most Recent):  Temp: 98 °F (36.7 °C) (07/20/25 0736)  Pulse: 70 (07/20/25 0758)  Resp: 18 (07/20/25 0736)  BP: (!) 151/80 (07/20/25 0736)  SpO2: 99 % (07/20/25 0736) Vital Signs (24h Range):  Temp:  [97.7 °F (36.5 °C)-98.1 °F (36.7 °C)] 98 °F (36.7 °C)  Pulse:  [64-92] 70  Resp:  [17-22] 18  SpO2:  [98 %-100 %] 99 %  BP: (125-219)/(65-98) 151/80     Weight: 77.6 kg (171 lb 1.2 oz)  Body mass index is 23.2 kg/m².    Intake/Output Summary (Last 24 hours) at 7/20/2025 1125  Last data filed at 7/20/2025 0736  Gross per 24 hour   Intake 240 ml   Output --   Net 240 ml         Physical Exam  Vitals and nursing note reviewed.   Constitutional:       General: He is not in acute distress.     Appearance: He is well-developed.   HENT:      Head: Normocephalic and atraumatic.      Right Ear: External ear normal.      Left Ear: External ear normal.   Cardiovascular:      Rate and Rhythm: Normal rate and regular rhythm.   Pulmonary:      Effort: Pulmonary effort is normal. No respiratory distress.   Skin:     General: Skin is warm and dry.   Neurological:      Mental Status: He is alert and oriented to person, place, and time.   Psychiatric:         Thought Content: Thought content normal.               Significant Labs: All pertinent labs within the past 24 hours have been reviewed.    Significant Imaging: I have reviewed all pertinent imaging results/findings within the past 24 hours.

## 2025-07-20 NOTE — ASSESSMENT & PLAN NOTE
Patient's blood pressure range in the last 24 hours was: BP  Min: 125/71  Max: 219/98.The patient's inpatient anti-hypertensive regimen is listed below:  Current Antihypertensives  , 2 times daily, Oral  hydrALAZINE injection 10 mg, Every 6 hours PRN, Intravenous  amLODIPine tablet 10 mg, Daily, Oral  ranolazine 12 hr tablet 1,000 mg, 2 times daily, Oral  nitroGLYCERIN SL tablet 0.4 mg, Every 5 min PRN, Sublingual    Plan  - BP is uncontrolled, will adjust as follows: continue home meds add PRN hydralazine and  single dose vasotec overnight  -

## 2025-07-20 NOTE — SUBJECTIVE & OBJECTIVE
Past Medical History:   Diagnosis Date    Cancer     prostate    Diabetes mellitus     Hypertension        History reviewed. No pertinent surgical history.    Review of patient's allergies indicates:   Allergen Reactions    Penicillins     Contrast media Hives and Itching       No current facility-administered medications on file prior to encounter.     Current Outpatient Medications on File Prior to Encounter   Medication Sig    atorvastatin (LIPITOR) 20 MG tablet Take 20 mg by mouth every evening.    cyanocobalamin, vitamin B-12, (VITAMIN B-12) 50 mcg tablet Take 50 mcg by mouth once daily.    docusate sodium (STOOL SOFTENER ORAL) Take by mouth every evening.    donepeziL (ARICEPT) 5 MG tablet Take 5 mg by mouth every evening.    ferrous sulfate (FEOSOL) Tab tablet Take 1 tablet by mouth once daily.    FLUoxetine 20 MG capsule Take 20 mg by mouth once daily.    mv-min/folic/K1/lycopen/lutein (CENTRUM SILVER MEN ORAL) Take by mouth.    OZEMPIC 0.25 mg or 0.5 mg (2 mg/3 mL) pen injector Inject 0.25 mg into the skin every 7 days.    ranolazine (RANEXA) 1,000 mg Tb12 Take 1,000 mg by mouth 2 (two) times daily.    traZODone (DESYREL) 50 MG tablet Take 100 mg by mouth every evening.    [DISCONTINUED] memantine (NAMENDA) 5 MG Tab Take 5 mg by mouth 2 (two) times daily.    [DISCONTINUED] ranolazine (RANEXA) 500 MG Tb12 Take 500 mg by mouth 2 (two) times daily.    amlodipine-benazepril 10-20mg (LOTREL) 10-20 mg per capsule Take 1 capsule by mouth once daily. (Patient not taking: Reported on 7/19/2025)    nitroGLYCERIN (NITROSTAT) 0.4 MG SL tablet Place 1 tablet (0.4 mg total) under the tongue every 5 (five) minutes as needed for Chest pain. Place 1 under tongue if you have Chest Pain every 5 minutes until pain resolves; if you take 3 and pain has not resolved, please come to the Emergency Room.    [DISCONTINUED] carvediloL (COREG) 12.5 MG tablet Take 12.5 mg by mouth 2 (two) times daily with meals.    [DISCONTINUED]  chlorproMAZINE (THORAZINE) 25 MG tablet Take 1 tablet (25 mg total) by mouth once as needed (hiccups).    [DISCONTINUED] levocetirizine (XYZAL) 5 MG tablet Take 5 mg by mouth every evening.    [DISCONTINUED] memantine (NAMENDA) 10 MG Tab Take 10 mg by mouth 2 (two) times daily.    [DISCONTINUED] omeprazole (PRILOSEC) 20 MG capsule Take 1 capsule (20 mg total) by mouth once daily.     Family History    None       Tobacco Use    Smoking status: Never    Smokeless tobacco: Never   Substance and Sexual Activity    Alcohol use: Not on file    Drug use: Not on file    Sexual activity: Not on file     Review of Systems   Cardiovascular:  Positive for chest pain (resolved).   All other systems reviewed and are negative.    Objective:     Vital Signs (Most Recent):  Temp: 97.8 °F (36.6 °C) (07/19/25 2150)  Pulse: 77 (07/19/25 2318)  Resp: 19 (07/19/25 2150)  BP: (!) 168/85 (07/19/25 2150)  SpO2: 98 % (07/19/25 2150) Vital Signs (24h Range):  Temp:  [97.8 °F (36.6 °C)-98.1 °F (36.7 °C)] 97.8 °F (36.6 °C)  Pulse:  [64-92] 77  Resp:  [18-22] 19  SpO2:  [98 %-100 %] 98 %  BP: (145-219)/(65-98) 168/85     Weight: 77.6 kg (171 lb 1.2 oz)  Body mass index is 23.2 kg/m².     Physical Exam  Vitals reviewed.   Constitutional:       Appearance: Normal appearance.   HENT:      Head: Normocephalic.   Eyes:      Pupils: Pupils are equal, round, and reactive to light.   Cardiovascular:      Rate and Rhythm: Normal rate and regular rhythm.      Pulses: Normal pulses.      Heart sounds: Normal heart sounds.   Pulmonary:      Effort: Pulmonary effort is normal.      Breath sounds: Normal breath sounds.   Abdominal:      General: Bowel sounds are normal.      Palpations: Abdomen is soft.   Musculoskeletal:         General: Normal range of motion.      Cervical back: Normal range of motion.   Skin:     General: Skin is warm and dry.      Capillary Refill: Capillary refill takes less than 2 seconds.   Neurological:      General: No focal deficit  present.      Mental Status: He is alert and oriented to person, place, and time. Mental status is at baseline.      Comments: forgetful              CRANIAL NERVES     CN III, IV, VI   Pupils are equal, round, and reactive to light.       Significant Labs: All pertinent labs within the past 24 hours have been reviewed.  Recent Lab Results         07/19/25 2036 07/19/25 1939 07/19/25 1938 07/19/25  1639        Albumin       3.7       ALP       50       ALT       28       Anion Gap       12       AST       24       Baso #       0.04       Basophil %       0.6       BILIRUBIN TOTAL       0.3  Comment: For infants and newborns, interpretation of results should be based   on gestational age, weight and in agreement with clinical   observations.    Premature Infant recommended reference ranges:   0-24 hours:  <8.0 mg/dL   24-48 hours: <12.0 mg/dL   3-5 days:    <15.0 mg/dL   6-29 days:   <15.0 mg/dL       BNP       28  Comment: Values of less than 100 pg/ml are consistent with non-CHF populations.        BUN       17       Calcium       8.8       Chloride       104       CO2       24       Creatinine       1.4       eGFR       53  Comment: Estimated GFR calculated using the CKD-EPI creatinine (2021) equation.       Eos #       0.03       Eos %       0.5       Glucose       173       Gran # (ANC)       4.06       Hematocrit       33.9       Hemoglobin       11.9       Immature Grans (Abs)       0.03  Comment: Mild elevation in immature granulocytes is non specific and can be seen in a variety of conditions including stress response, acute inflammation, trauma and pregnancy. Correlation with other laboratory and clinical findings is essential.       Immature Granulocytes       0.5       Lymph #       1.40       Lymph %       22.0       Magnesium        2.0       MCH       28.7       MCHC       35.1       MCV       82       Mono #       0.80       Mono %       12.6       MPV       8.9       Neut %       63.8        nRBC       0       Platelet Count       215       POCT Glucose     91         Potassium       3.9       PROTEIN TOTAL       6.7       RBC       4.14       RDW       14.0       Sodium       140       Troponin I <0.006  Comment: The reference interval for Troponin I represents the 99th percentile cutoff for our facility and is consistent with 3rd generation assay performance.   <0.006  Comment: The reference interval for Troponin I represents the 99th percentile cutoff for our facility and is consistent with 3rd generation assay performance.     0.006  Comment: The reference interval for Troponin I represents the 99th percentile cutoff for our facility and is consistent with 3rd generation assay performance.       WBC       6.36               Significant Imaging: I have reviewed all pertinent imaging results/findings within the past 24 hours.  I have reviewed and interpreted all pertinent imaging results/findings within the past 24 hours.

## 2025-07-20 NOTE — CONSULTS
[facility] - Telemetry  Cardiology  Consult Note    Patient Name: Artie Mei  MRN: [id number]  Admission Date: 7/19/2025  Hospital Length of Stay: 0 days  Code Status: Full Code   Attending Provider: Celia Faustin MD   Consulting Provider: Анна Hartman MD  Primary Care Physician: Kp Kwong MD  Principal Problem:Chest pain    Patient information was obtained from patient and ER records.     Inpatient consult to Cardiology  Consult performed by: Анна Hartman MD  Consult ordered by: Khari Johnosn NP        Subjective:     Chief Complaint:  cp     HPI:   Patient is a pleasant 74-year-old man with past medical history significant for hypertension  dyslipidemia coronary artery disease status post multiple PCIs.  Came in with chest pains.  States that pain was different than his prior anginal pain.  Currently chest pain-free.          HPI:  Patient is a 74-year-old male with a history of HTN, HLD, GERD, Alzheimer's dementia, CAD PCI x7 presented to ED with midsternal chest pain which he reports has been intermittent over the last week.  Denies any radiating chest pain, shortness for breath, nausea, syncope, weakness.  EKG sinus rhythm 93 no ST-elevation, depressions in II, III, AVF, seen in prior EKG from 2023.  Denies any active chest pain.  BNP and troponin both negative , patient will be admitted to Hospital Medicine with a cardiology consult given his cardiac history.     Overview/Hospital Course:  Mr. Mei was placed in observation for ACS rule out after presenting with chest pain.  EKG without evidence of acute ischemia, troponin negative x3, currently pain free.  Workup without evidence of other acute abnormality.  Cardiology consult, appreciate recs.     Past Medical History:   Diagnosis Date    Cancer     prostate    Diabetes mellitus     Hypertension        History reviewed. No pertinent surgical history.    Review of patient's allergies indicates:   Allergen Reactions     Penicillins     Contrast media Hives and Itching       No current facility-administered medications on file prior to encounter.     Current Outpatient Medications on File Prior to Encounter   Medication Sig    atorvastatin (LIPITOR) 20 MG tablet Take 20 mg by mouth every evening.    cyanocobalamin, vitamin B-12, (VITAMIN B-12) 50 mcg tablet Take 50 mcg by mouth once daily.    docusate sodium (STOOL SOFTENER ORAL) Take by mouth every evening.    donepeziL (ARICEPT) 5 MG tablet Take 5 mg by mouth every evening.    ferrous sulfate (FEOSOL) Tab tablet Take 1 tablet by mouth once daily.    FLUoxetine 20 MG capsule Take 20 mg by mouth once daily.    mv-min/folic/K1/lycopen/lutein (CENTRUM SILVER MEN ORAL) Take by mouth.    OZEMPIC 0.25 mg or 0.5 mg (2 mg/3 mL) pen injector Inject 0.25 mg into the skin every 7 days.    ranolazine (RANEXA) 1,000 mg Tb12 Take 1,000 mg by mouth 2 (two) times daily.    traZODone (DESYREL) 50 MG tablet Take 100 mg by mouth every evening.    amlodipine-benazepril 10-20mg (LOTREL) 10-20 mg per capsule Take 1 capsule by mouth once daily. (Patient not taking: Reported on 7/19/2025)    nitroGLYCERIN (NITROSTAT) 0.4 MG SL tablet Place 1 tablet (0.4 mg total) under the tongue every 5 (five) minutes as needed for Chest pain. Place 1 under tongue if you have Chest Pain every 5 minutes until pain resolves; if you take 3 and pain has not resolved, please come to the Emergency Room.     Family History    None       Tobacco Use    Smoking status: Never    Smokeless tobacco: Never   Substance and Sexual Activity    Alcohol use: Not on file    Drug use: Not on file    Sexual activity: Not on file     Review of Systems   Constitutional: Negative.   HENT: Negative.     Eyes: Negative.    Cardiovascular:  Positive for chest pain.   Respiratory: Negative.     Endocrine: Negative.    Hematologic/Lymphatic: Negative.    Skin: Negative.    Musculoskeletal: Negative.    Gastrointestinal: Negative.    Genitourinary:  Negative.    Neurological: Negative.    Psychiatric/Behavioral: Negative.     Allergic/Immunologic: Negative.      Objective:     Vital Signs (Most Recent):  Temp: 97.9 °F (36.6 °C) (07/20/25 1141)  Pulse: 72 (07/20/25 1226)  Resp: 19 (07/20/25 1141)  BP: 138/78 (07/20/25 1141)  SpO2: 100 % (07/20/25 1141) Vital Signs (24h Range):  Temp:  [97.7 °F (36.5 °C)-98.1 °F (36.7 °C)] 97.9 °F (36.6 °C)  Pulse:  [64-92] 72  Resp:  [17-22] 19  SpO2:  [98 %-100 %] 100 %  BP: (125-219)/(65-98) 138/78     Weight: 77.6 kg (171 lb 1.2 oz)  Body mass index is 23.2 kg/m².    SpO2: 100 %         Intake/Output Summary (Last 24 hours) at 7/20/2025 1502  Last data filed at 7/20/2025 1256  Gross per 24 hour   Intake 480 ml   Output --   Net 480 ml       Lines/Drains/Airways       Peripheral Intravenous Line  Duration             Peripheral IV 07/19/25 1705 20 G Right Antecubital <1 day                     Physical Exam  Vitals reviewed.   Constitutional:       Appearance: He is well-developed.   HENT:      Head: Normocephalic.   Eyes:      Conjunctiva/sclera: Conjunctivae normal.      Pupils: Pupils are equal, round, and reactive to light.   Cardiovascular:      Rate and Rhythm: Normal rate and regular rhythm.      Heart sounds: Normal heart sounds.   Pulmonary:      Effort: Pulmonary effort is normal.      Breath sounds: Normal breath sounds.   Abdominal:      General: Bowel sounds are normal.      Palpations: Abdomen is soft.   Musculoskeletal:      Cervical back: Normal range of motion and neck supple.   Skin:     General: Skin is warm.   Neurological:      Mental Status: He is alert and oriented to person, place, and time.          Significant Labs:     Laboratory:  CBC:  Recent Labs   Lab 07/20/23  1023 07/19/25  1639 07/20/25  0428   WBC 6.03 6.36 6.75   Hemoglobin 12.9 L  --   --    HGB  --  11.9 L 11.3 L   Hematocrit 35.5 L  --   --    HCT  --  33.9 L 32.3 L   Platelet Count  --  215 220   Platelets 262  --   --         CHEMISTRIES:  Recent Labs   Lab 07/20/23  1023 09/27/24  1555 09/30/24  1417 07/19/25  1639 07/20/25  0428   Glucose 130 H  --   --  173 H 119 H   Sodium 139   < > 132 L 140 142   Potassium 4.0   < > 4.0 3.9 3.9   BUN 15   < > 8.7 L 17 16   Creatinine 1.3   < > 1.31 H 1.4 1.3   Calcium 9.7   < > 8.7 8.8 8.8   Magnesium   --   --   --  2.0  --    Magnesium 1.7  --   --   --   --     < > = values in this interval not displayed.       CARDIAC BIOMARKERS:  Recent Labs   Lab 09/27/24  1555 07/19/25  1639 07/19/25  1939 07/19/25 2036   CK 85  --   --   --    Troponin-I  --  0.006 <0.006 <0.006       COAGS:  Recent Labs   Lab 09/27/24  1555   INR 1.0       LIPIDS/LFTS:  Recent Labs   Lab 01/13/23 2025 07/20/23  1023 09/30/24  1417 07/19/25  1639 07/20/25  0428   Cholesterol 167  --   --   --   --    Triglycerides 54  --   --   --   --    HDL 76 H  --   --   --   --    LDL Cholesterol 80.2  --   --   --   --    Non-HDL Cholesterol 91  --   --   --   --    AST  --    < > 33 24 20   ALT  --    < > 24 28 22    < > = values in this interval not displayed.       Hemoglobin A1C   Date Value Ref Range Status   01/13/2023 6.1 (H) 4.0 - 5.6 % Final     Comment:     ADA Screening Guidelines:  5.7-6.4%  Consistent with prediabetes  >or=6.5%  Consistent with diabetes    High levels of fetal hemoglobin interfere with the HbA1C  assay. Heterozygous hemoglobin variants (HbS, HgC, etc)do  not significantly interfere with this assay.   However, presence of multiple variants may affect accuracy.         TSH  Recent Labs   Lab 01/13/23 2025 07/20/23  1023   TSH 0.469 0.794       The 10-year ASCVD risk score (Anselmo DK, et al., 2019) is: 20.1%    Values used to calculate the score:      Age: 74 years      Sex: Male      Is Non- : Yes      Diabetic: No      Tobacco smoker: No      Systolic Blood Pressure: 138 mmHg      Is BP treated: Yes      HDL Cholesterol: 76 mg/dL      Total Cholesterol: 167 mg/dL        BNP    Lab Results   Component Value Date/Time    BNP 28 07/19/2025 04:39 PM    BNP 23 01/13/2023 05:43 PM            ECHO    Results for orders placed during the hospital encounter of 01/13/23    Echo    Interpretation Summary  · The left ventricle is normal in size with moderate concentric hypertrophy and normal systolic function.  · The estimated ejection fraction is 70%.  · Normal right ventricular size with normal right ventricular systolic function.  · The estimated PA systolic pressure is 22 mmHg.            Medications Discontinued During This Encounter   Medication Reason    magnesium sulfate in dextrose IVPB (premix) 1 g     memantine (NAMENDA) 5 MG Tab Dose adjustment    levocetirizine (XYZAL) 5 MG tablet Patient no longer taking    memantine (NAMENDA) 10 MG Tab Patient no longer taking    ranolazine (RANEXA) 500 MG Tb12 Dose adjustment    chlorproMAZINE (THORAZINE) 25 MG tablet Patient no longer taking    carvediloL (COREG) 12.5 MG tablet Patient no longer taking    omeprazole (PRILOSEC) 20 MG capsule Patient no longer taking         DATA:       Assessment and Plan:     * Chest pain    In patient with multiple risk factors for coronary artery disease.   Troponins negative.  Check stress test in a.m..        VTE Risk Mitigation (From admission, onward)           Ordered     Reason for No Pharmacological VTE Prophylaxis  Once        Question:  Reasons:  Answer:  Patient is Ambulatory    07/19/25 1833     IP VTE HIGH RISK PATIENT  Once         07/19/25 1833     Place sequential compression device  Until discontinued         07/19/25 1833                    Thank you for your consult. I will follow-up with patient. Please contact us if you have any additional questions.    Анна Hartman MD  Cardiology   [facility]

## 2025-07-21 VITALS
HEART RATE: 76 BPM | WEIGHT: 171 LBS | DIASTOLIC BLOOD PRESSURE: 72 MMHG | BODY MASS INDEX: 23.16 KG/M2 | HEIGHT: 72 IN | RESPIRATION RATE: 18 BRPM | TEMPERATURE: 98 F | OXYGEN SATURATION: 100 % | SYSTOLIC BLOOD PRESSURE: 131 MMHG

## 2025-07-21 LAB
ABSOLUTE EOSINOPHIL (OHS): 0.1 K/UL
ABSOLUTE MONOCYTE (OHS): 0.85 K/UL (ref 0.3–1)
ABSOLUTE NEUTROPHIL COUNT (OHS): 4.2 K/UL (ref 1.8–7.7)
ALBUMIN SERPL BCP-MCNC: 3.4 G/DL (ref 3.5–5.2)
ALP SERPL-CCNC: 46 UNIT/L (ref 40–150)
ALT SERPL W/O P-5'-P-CCNC: 21 UNIT/L (ref 10–44)
ANION GAP (OHS): 8 MMOL/L (ref 8–16)
AORTIC ROOT ANNULUS: 3.1 CM
AORTIC SIZE INDEX (SOV): 1.8 CM/M2
AORTIC SIZE INDEX: 1.5 CM/M2
AORTIC VALVE CUSP SEPERATION: 2.03 CM
ASCENDING AORTA: 3 CM
AST SERPL-CCNC: 19 UNIT/L (ref 11–45)
AV INDEX (PROSTH): 1.19
AV MEAN GRADIENT: 3 MMHG
AV PEAK GRADIENT: 6 MMHG
AV VALVE AREA BY VELOCITY RATIO: 3.5 CM²
AV VALVE AREA: 4.5 CM²
AV VELOCITY RATIO: 0.92
BASOPHILS # BLD AUTO: 0.04 K/UL
BASOPHILS NFR BLD AUTO: 0.6 %
BILIRUB SERPL-MCNC: 0.4 MG/DL (ref 0.1–1)
BSA FOR ECHO PROCEDURE: 1.98 M2
BUN SERPL-MCNC: 18 MG/DL (ref 8–23)
CALCIUM SERPL-MCNC: 9 MG/DL (ref 8.7–10.5)
CHLORIDE SERPL-SCNC: 108 MMOL/L (ref 95–110)
CO2 SERPL-SCNC: 23 MMOL/L (ref 23–29)
CREAT SERPL-MCNC: 1.3 MG/DL (ref 0.5–1.4)
CV ECHO LV RWT: 0.43 CM
CV STRESS BASE HR: 80 BPM
DIASTOLIC BLOOD PRESSURE: 74 MMHG
DOP CALC AO PEAK VEL: 1.2 M/S
DOP CALC AO VTI: 20.3 CM
DOP CALC LVOT AREA: 3.8 CM2
DOP CALC LVOT DIAMETER: 2.2 CM
DOP CALC LVOT PEAK VEL: 1.1 M/S
DOP CALC LVOT STROKE VOLUME: 91.6 CM3
DOP CALCLVOT PEAK VEL VTI: 24.1 CM
E WAVE DECELERATION TIME: 263 MSEC
E/A RATIO: 0.62
E/E' RATIO: 7 M/S
ECHO LV POSTERIOR WALL: 1 CM (ref 0.6–1.1)
ERYTHROCYTE [DISTWIDTH] IN BLOOD BY AUTOMATED COUNT: 14 % (ref 11.5–14.5)
FRACTIONAL SHORTENING: 30.4 % (ref 28–44)
GFR SERPLBLD CREATININE-BSD FMLA CKD-EPI: 58 ML/MIN/1.73/M2
GLUCOSE SERPL-MCNC: 102 MG/DL (ref 70–110)
HCT VFR BLD AUTO: 33.4 % (ref 40–54)
HGB BLD-MCNC: 11.8 GM/DL (ref 14–18)
HOLD SPECIMEN: NORMAL
IMM GRANULOCYTES # BLD AUTO: 0.02 K/UL (ref 0–0.04)
IMM GRANULOCYTES NFR BLD AUTO: 0.3 % (ref 0–0.5)
INTERVENTRICULAR SEPTUM: 1 CM (ref 0.6–1.1)
IVRT: 137 MSEC
LA MAJOR: 5.4 CM
LA MINOR: 5.1 CM
LA WIDTH: 3.7 CM
LEFT ATRIUM SIZE: 3.5 CM
LEFT ATRIUM VOLUME INDEX: 29 ML/M2
LEFT ATRIUM VOLUME: 58 CM3
LEFT INTERNAL DIMENSION IN SYSTOLE: 3.2 CM (ref 2.1–4)
LEFT VENTRICLE DIASTOLIC VOLUME INDEX: 48.74 ML/M2
LEFT VENTRICLE DIASTOLIC VOLUME: 97 ML
LEFT VENTRICLE MASS INDEX: 79.8 G/M2
LEFT VENTRICLE SYSTOLIC VOLUME INDEX: 20.1 ML/M2
LEFT VENTRICLE SYSTOLIC VOLUME: 40 ML
LEFT VENTRICULAR INTERNAL DIMENSION IN DIASTOLE: 4.6 CM (ref 3.5–6)
LEFT VENTRICULAR MASS: 158.8 G
LV LATERAL E/E' RATIO: 5.2 M/S
LV SEPTAL E/E' RATIO: 9.5 M/S
LVED V (TEICH): 97.33 ML
LVES V (TEICH): 39.65 ML
LVOT MG: 2.76 MMHG
LVOT MV: 0.8 CM/S
LYMPHOCYTES # BLD AUTO: 2.03 K/UL (ref 1–4.8)
Lab: 1.6 CM/M
Lab: 1.9 CM/M
MCH RBC QN AUTO: 29.3 PG (ref 27–31)
MCHC RBC AUTO-ENTMCNC: 35.3 G/DL (ref 32–36)
MCV RBC AUTO: 83 FL (ref 82–98)
MV PEAK A VEL: 0.92 M/S
MV PEAK E VEL: 0.57 M/S
MV STENOSIS PRESSURE HALF TIME: 76.29 MS
MV VALVE AREA P 1/2 METHOD: 2.88 CM2
NUC REST EJECTION FRACTION: 68
NUCLEATED RBC (/100WBC) (OHS): 0 /100 WBC
OHS CV CPX 85 PERCENT MAX PREDICTED HEART RATE MALE: 125
OHS CV CPX MAX PREDICTED HEART RATE: 147
OHS CV CPX PATIENT HEIGHT IN: 72
OHS CV CPX PATIENT HEIGHT IN: 72
OHS CV CPX PATIENT IS FEMALE: 0
OHS CV CPX PATIENT IS MALE: 1
OHS CV CPX PEAK DIASTOLIC BLOOD PRESSURE: 53 MMHG
OHS CV CPX PEAK HEAR RATE: 93 BPM
OHS CV CPX PEAK RATE PRESSURE PRODUCT: 7812
OHS CV CPX PEAK SYSTOLIC BLOOD PRESSURE: 84 MMHG
OHS CV CPX PERCENT MAX PREDICTED HEART RATE ACHIEVED: 63
OHS CV CPX RATE PRESSURE PRODUCT PRESENTING: NORMAL
OHS CV INITIAL DOSE: 10.5 MCG/KG/MIN
OHS CV PEAK DOSE: 30.9 MCG/KG/MIN
OHS CV RV/LV RATIO: 0.54 CM
OHS QRS DURATION: 92 MS
OHS QTC CALCULATION: 465 MS
PISA TR MAX VEL: 2.2 M/S
PLATELET # BLD AUTO: 211 K/UL (ref 150–450)
PMV BLD AUTO: 9.2 FL (ref 9.2–12.9)
POCT GLUCOSE: 116 MG/DL (ref 70–110)
POCT GLUCOSE: 81 MG/DL (ref 70–110)
POTASSIUM SERPL-SCNC: 3.9 MMOL/L (ref 3.5–5.1)
PROT SERPL-MCNC: 6.3 GM/DL (ref 6–8.4)
PULM VEIN S/D RATIO: 2.1
PV PEAK D VEL: 0.3 M/S
PV PEAK GRADIENT: 3 MMHG
PV PEAK S VEL: 0.63 M/S
PV PEAK VELOCITY: 0.84 M/S
RA MAJOR: 5.02 CM
RA PRESSURE ESTIMATED: 3 MMHG
RA WIDTH: 2.9 CM
RBC # BLD AUTO: 4.03 M/UL (ref 4.6–6.2)
RELATIVE EOSINOPHIL (OHS): 1.4 %
RELATIVE LYMPHOCYTE (OHS): 28 % (ref 18–48)
RELATIVE MONOCYTE (OHS): 11.7 % (ref 4–15)
RELATIVE NEUTROPHIL (OHS): 58 % (ref 38–73)
RIGHT VENTRICLE DIASTOLIC BASEL DIMENSION: 2.5 CM
RIGHT VENTRICULAR END-DIASTOLIC DIMENSION: 2.5 CM
RV TB RVSP: 5 MMHG
RV TISSUE DOPPLER FREE WALL SYSTOLIC VELOCITY 1 (APICAL 4 CHAMBER VIEW): 13.47 CM/S
SINUS: 3.5 CM
SODIUM SERPL-SCNC: 139 MMOL/L (ref 136–145)
STJ: 3.1 CM
SYSTOLIC BLOOD PRESSURE: 128 MMHG
TDI LATERAL: 0.11 M/S
TDI SEPTAL: 0.06 M/S
TDI: 0.09 M/S
TR MAX PG: 19 MMHG
TRICUSPID ANNULAR PLANE SYSTOLIC EXCURSION: 2 CM
TV PEAK GRADIENT: 2 MMHG
TV REST PULMONARY ARTERY PRESSURE: 22 MMHG
WBC # BLD AUTO: 7.24 K/UL (ref 3.9–12.7)
Z-SCORE OF LEFT VENTRICULAR DIMENSION IN END DIASTOLE: -2.27
Z-SCORE OF LEFT VENTRICULAR DIMENSION IN END SYSTOLE: -0.81

## 2025-07-21 PROCEDURE — 96361 HYDRATE IV INFUSION ADD-ON: CPT

## 2025-07-21 PROCEDURE — 25000003 PHARM REV CODE 250: Performed by: INTERNAL MEDICINE

## 2025-07-21 PROCEDURE — 80053 COMPREHEN METABOLIC PANEL: CPT | Performed by: REGISTERED NURSE

## 2025-07-21 PROCEDURE — 85025 COMPLETE CBC W/AUTO DIFF WBC: CPT | Performed by: REGISTERED NURSE

## 2025-07-21 PROCEDURE — 25000003 PHARM REV CODE 250: Performed by: REGISTERED NURSE

## 2025-07-21 PROCEDURE — 99499 UNLISTED E&M SERVICE: CPT | Mod: ,,, | Performed by: INTERNAL MEDICINE

## 2025-07-21 PROCEDURE — 96375 TX/PRO/DX INJ NEW DRUG ADDON: CPT

## 2025-07-21 PROCEDURE — 94761 N-INVAS EAR/PLS OXIMETRY MLT: CPT

## 2025-07-21 PROCEDURE — 36415 COLL VENOUS BLD VENIPUNCTURE: CPT | Performed by: REGISTERED NURSE

## 2025-07-21 PROCEDURE — A9502 TC99M TETROFOSMIN: HCPCS | Performed by: STUDENT IN AN ORGANIZED HEALTH CARE EDUCATION/TRAINING PROGRAM

## 2025-07-21 PROCEDURE — G0378 HOSPITAL OBSERVATION PER HR: HCPCS

## 2025-07-21 PROCEDURE — 63600175 PHARM REV CODE 636 W HCPCS: Performed by: INTERNAL MEDICINE

## 2025-07-21 RX ORDER — AMLODIPINE BESYLATE 10 MG/1
10 TABLET ORAL DAILY
Qty: 90 TABLET | Refills: 3 | Status: SHIPPED | OUTPATIENT
Start: 2025-07-22 | End: 2026-07-22

## 2025-07-21 RX ORDER — NAPROXEN SODIUM 220 MG/1
81 TABLET, FILM COATED ORAL DAILY
Qty: 90 TABLET | Refills: 3 | Status: SHIPPED | OUTPATIENT
Start: 2025-07-22 | End: 2026-07-22

## 2025-07-21 RX ORDER — REGADENOSON 0.08 MG/ML
0.4 INJECTION, SOLUTION INTRAVENOUS ONCE
Status: COMPLETED | OUTPATIENT
Start: 2025-07-21 | End: 2025-07-21

## 2025-07-21 RX ADMIN — FLUOXETINE HYDROCHLORIDE 20 MG: 20 CAPSULE ORAL at 10:07

## 2025-07-21 RX ADMIN — TETROFOSMIN 30.9 MILLICURIE: 1.38 INJECTION, POWDER, LYOPHILIZED, FOR SOLUTION INTRAVENOUS at 08:07

## 2025-07-21 RX ADMIN — SODIUM CHLORIDE 250 ML: 9 INJECTION, SOLUTION INTRAVENOUS at 09:07

## 2025-07-21 RX ADMIN — ASPIRIN 81 MG CHEWABLE TABLET 81 MG: 81 TABLET CHEWABLE at 10:07

## 2025-07-21 RX ADMIN — TETROFOSMIN 10.5 MILLICURIE: 1.38 INJECTION, POWDER, LYOPHILIZED, FOR SOLUTION INTRAVENOUS at 07:07

## 2025-07-21 RX ADMIN — REGADENOSON 0.4 MG: 0.08 INJECTION, SOLUTION INTRAVENOUS at 08:07

## 2025-07-21 RX ADMIN — RANOLAZINE 1000 MG: 500 TABLET, FILM COATED, EXTENDED RELEASE ORAL at 10:07

## 2025-07-21 RX ADMIN — AMLODIPINE BESYLATE 10 MG: 5 TABLET ORAL at 10:07

## 2025-07-21 NOTE — DISCHARGE INSTRUCTIONS
Our goal at [facility] is to always give you outstanding care and exceptional service. You may receive a survey from [employer] by mail, text or e-mail in the next 24-48 hours asking about the care you received with us. The survey should only take 5-10 minutes to complete and is very important to us.     Your feedback provides us with a way to recognize our staff who work tirelessly to provide the best care! Also, your responses help us learn how to improve when your experience was below our aspiration of excellence. We are always looking for ways to improve your stay. We WILL use your feedback to continue making improvements to help us provide the highest quality care. We keep your personal information and feedback confidential. We appreciate your time completing this survey and can't wait to hear from you!!!    We look forward to your continued care with us! Thanks so much for choosing [facility] for your healthcare needs!

## 2025-07-21 NOTE — PLAN OF CARE
[facility] attempted contacting patient's PCP to schedule a hospital followup appointment.  Voice message left at PCP's office requesting return call to [facility]IP at [phone].

## 2025-07-21 NOTE — ASSESSMENT & PLAN NOTE
In patient with multiple risk factors for coronary artery disease.   Troponins negative.    Stress test did not show any significant ischemia.  Chest pain-free.  No further ischemic workup indicated during this hospital stay.  Will sign off.  Follow-up in Cardiology Clinic.

## 2025-07-21 NOTE — PLAN OF CARE
Problem: Adult Inpatient Plan of Care  Goal: Plan of Care Review  7/21/2025 1633 by Lita Sellers LPN  Outcome: Met  7/21/2025 1633 by Lita Sellers LPN  Outcome: Progressing  Goal: Patient-Specific Goal (Individualized)  Outcome: Met  Goal: Absence of Hospital-Acquired Illness or Injury  Outcome: Met  Goal: Optimal Comfort and Wellbeing  Outcome: Met  Goal: Readiness for Transition of Care  Outcome: Met     Problem: Diabetes Comorbidity  Goal: Blood Glucose Level Within Targeted Range  Outcome: Met     Problem: Fall Injury Risk  Goal: Absence of Fall and Fall-Related Injury  7/21/2025 1633 by Lita Sellers LPN  Outcome: Met  7/21/2025 1633 by Lita Sellers LPN  Outcome: Progressing     Problem: Comorbidity Management  Goal: Blood Pressure in Desired Range  7/21/2025 1633 by Lita Sellers LPN  Outcome: Met  7/21/2025 1633 by Lita Sellers LPN  Outcome: Progressing     Problem: Chest Pain  Goal: Resolution of Chest Pain Symptoms  7/21/2025 1633 by Lita Sellers LPN  Outcome: Met  7/21/2025 1633 by Lita Sellers LPN  Outcome: Progressing

## 2025-07-21 NOTE — PLAN OF CARE
Problem: Adult Inpatient Plan of Care  Goal: Plan of Care Review  Outcome: Progressing     Problem: Adult Inpatient Plan of Care  Goal: Optimal Comfort and Wellbeing  Outcome: Progressing     Problem: Diabetes Comorbidity  Goal: Blood Glucose Level Within Targeted Range  Outcome: Progressing     Problem: Fall Injury Risk  Goal: Absence of Fall and Fall-Related Injury  Outcome: Progressing     Problem: Chest Pain  Goal: Resolution of Chest Pain Symptoms  Outcome: Progressing

## 2025-07-21 NOTE — PLAN OF CARE
Problem: Adult Inpatient Plan of Care  Goal: Plan of Care Review  Outcome: Progressing     Problem: Fall Injury Risk  Goal: Absence of Fall and Fall-Related Injury  Outcome: Progressing     Problem: Comorbidity Management  Goal: Blood Pressure in Desired Range  Outcome: Progressing     Problem: Chest Pain  Goal: Resolution of Chest Pain Symptoms  Outcome: Progressing

## 2025-07-21 NOTE — NURSING
Report received from night nurse AJAY Bravo Visualized patient and assessed patient's overall condition and appearance. No acute distress noted. Plan of care ongoing.    [facility]  Nurses Note -- 4 Eyes      7/21/2025       Skin assessed on: Q Shift      [x] No Pressure Injuries Present    []Prevention Measures Documented    [] Yes LDA  for Pressure Injury Previously documented     [] Yes New Pressure Injury Discovered   [] LDA for New Pressure Injury Added      Attending RN:  Lita Sellers LPN     Second RN:  AJAY Bravo

## 2025-07-21 NOTE — PROGRESS NOTES
[facility] Telemetry  Cardiology  Progress Note    Patient Name: Artie Mei  MRN: [id number]  Admission Date: 7/19/2025  Hospital Length of Stay: 0 days  Code Status: Full Code   Attending Physician: Celia Faustin MD   Primary Care Physician: Kp Kwong MD  Expected Discharge Date: 7/21/2025  Principal Problem:Chest pain    Subjective:     Interval History:   Chest pain-free overnight.    Review of Systems   Constitutional: Negative.   HENT: Negative.     Eyes: Negative.    Respiratory: Negative.     Endocrine: Negative.    Hematologic/Lymphatic: Negative.    Skin: Negative.    Musculoskeletal: Negative.    Gastrointestinal: Negative.    Genitourinary: Negative.    Neurological: Negative.    Psychiatric/Behavioral: Negative.     Allergic/Immunologic: Negative.      Objective:     Vital Signs (Most Recent):  Temp: 97.5 °F (36.4 °C) (07/21/25 1113)  Pulse: 75 (07/21/25 1128)  Resp: 18 (07/21/25 1113)  BP: 107/69 (07/21/25 1113)  SpO2: 100 % (07/21/25 1113) Vital Signs (24h Range):  Temp:  [97.5 °F (36.4 °C)-98.2 °F (36.8 °C)] 97.5 °F (36.4 °C)  Pulse:  [67-89] 75  Resp:  [18] 18  SpO2:  [98 %-100 %] 100 %  BP: (107-139)/(68-78) 107/69     Weight: 77.6 kg (171 lb)  Body mass index is 23.19 kg/m².     SpO2: 100 %       No intake or output data in the 24 hours ending 07/21/25 1407    Lines/Drains/Airways       Peripheral Intravenous Line  Duration             Peripheral IV 07/19/25 1705 20 G Right Antecubital 1 day                       Physical Exam  Vitals reviewed.   Constitutional:       Appearance: He is well-developed.   HENT:      Head: Normocephalic.   Eyes:      Conjunctiva/sclera: Conjunctivae normal.      Pupils: Pupils are equal, round, and reactive to light.   Cardiovascular:      Rate and Rhythm: Normal rate and regular rhythm.      Heart sounds: Normal heart sounds.   Pulmonary:      Effort: Pulmonary effort is normal.      Breath sounds: Normal breath sounds.   Abdominal:       General: Bowel sounds are normal.      Palpations: Abdomen is soft.   Musculoskeletal:      Cervical back: Normal range of motion and neck supple.   Skin:     General: Skin is warm.   Neurological:      Mental Status: He is alert and oriented to person, place, and time.            Significant Labs:     Laboratory:  CBC:  Recent Labs   Lab 07/19/25  1639 07/20/25 0428 07/21/25 0413   WBC 6.36 6.75 7.24   HGB 11.9 L 11.3 L 11.8 L   HCT 33.9 L 32.3 L 33.4 L   Platelet Count 215 220 211       CHEMISTRIES:  Recent Labs   Lab 07/20/23  1023 09/27/24  1555 07/19/25 1639 07/20/25 0428 07/21/25  0413   Glucose 130 H  --  173 H 119 H 102   Sodium 139   < > 140 142 139   Potassium 4.0   < > 3.9 3.9 3.9   BUN 15   < > 17 16 18   Creatinine 1.3   < > 1.4 1.3 1.3   Calcium 9.7   < > 8.8 8.8 9.0   Magnesium   --   --  2.0  --   --    Magnesium 1.7  --   --   --   --     < > = values in this interval not displayed.       CARDIAC BIOMARKERS:  Recent Labs   Lab 09/27/24  1555 07/19/25  1639 07/19/25 1939 07/19/25 2036   CK 85  --   --   --    Troponin-I  --  0.006 <0.006 <0.006       COAGS:  Recent Labs   Lab 09/27/24  1555   INR 1.0       LIPIDS/LFTS:  Recent Labs   Lab 01/13/23 2025 07/20/23  1023 07/19/25  1639 07/20/25 0428 07/21/25 0413   Cholesterol 167  --   --   --   --    Triglycerides 54  --   --   --   --    HDL 76 H  --   --   --   --    LDL Cholesterol 80.2  --   --   --   --    Non-HDL Cholesterol 91  --   --   --   --    AST  --    < > 24 20 19   ALT  --    < > 28 22 21    < > = values in this interval not displayed.       Hemoglobin A1C   Date Value Ref Range Status   01/13/2023 6.1 (H) 4.0 - 5.6 % Final     Comment:     ADA Screening Guidelines:  5.7-6.4%  Consistent with prediabetes  >or=6.5%  Consistent with diabetes    High levels of fetal hemoglobin interfere with the HbA1C  assay. Heterozygous hemoglobin variants (HbS, HgC, etc)do  not significantly interfere with this assay.   However, presence of  multiple variants may affect accuracy.         TSH  Recent Labs   Lab 01/13/23 2025 07/20/23  1023   TSH 0.469 0.794       The 10-year ASCVD risk score (Anselmo CASTRO, et al., 2019) is: 12.9%    Values used to calculate the score:      Age: 74 years      Sex: Male      Is Non- : Yes      Diabetic: No      Tobacco smoker: No      Systolic Blood Pressure: 107 mmHg      Is BP treated: Yes      HDL Cholesterol: 76 mg/dL      Total Cholesterol: 167 mg/dL       BNP    Lab Results   Component Value Date/Time    BNP 28 07/19/2025 04:39 PM    BNP 23 01/13/2023 05:43 PM            ECHO    Results for orders placed during the hospital encounter of 07/19/25    Echo    Interpretation Summary    Left Ventricle: The left ventricle is normal in size. Normal wall thickness. There is normal systolic function with a visually estimated ejection fraction of 60 - 65%. Grade I diastolic dysfunction.    Right Ventricle: The right ventricle is normal in size measuring 2.5 cm. Systolic function is normal.    Left Atrium: The left atrium is moderately dialted.    Right Atrium: The right atrium is moderately dilated .    Pulmonary Artery: The estimated pulmonary artery systolic pressure is 22 mmHg.    IVC/SVC: Normal venous pressure at 3 mmHg.      STRESS TEST    Results for orders placed during the hospital encounter of 07/19/25    Nuclear Stress - Cardiology Interpreted    Interpretation Summary    Normal myocardial perfusion scan. There is no evidence of myocardial ischemia or infarction.    There is a mild to moderate intensity fixed perfusion abnormality in the inferior wall of the left ventricle secondary to diaphragm attenuation.    The gated perfusion images showed an ejection fraction of 68% at rest.    There is normal wall motion at rest and post-stress.    The ECG portion of the study is negative for ischemia.    The patient reported chest pain during the stress test.    During stress, rare PVCs are  noted.        CATH    No results found for this or any previous visit.            Medications Discontinued During This Encounter   Medication Reason    magnesium sulfate in dextrose IVPB (premix) 1 g     memantine (NAMENDA) 5 MG Tab Dose adjustment    levocetirizine (XYZAL) 5 MG tablet Patient no longer taking    memantine (NAMENDA) 10 MG Tab Patient no longer taking    ranolazine (RANEXA) 500 MG Tb12 Dose adjustment    chlorproMAZINE (THORAZINE) 25 MG tablet Patient no longer taking    carvediloL (COREG) 12.5 MG tablet Patient no longer taking    omeprazole (PRILOSEC) 20 MG capsule Patient no longer taking    amlodipine-benazepril 10-20mg (LOTREL) 10-20 mg per capsule Stop Taking at Discharge         DATA:       Assessment and Plan:     Brief HPI:     * Chest pain    In patient with multiple risk factors for coronary artery disease.   Troponins negative.    Stress test did not show any significant ischemia.  Chest pain-free.  No further ischemic workup indicated during this hospital stay.  Will sign off.  Follow-up in Cardiology Clinic.        VTE Risk Mitigation (From admission, onward)           Ordered     Reason for No Pharmacological VTE Prophylaxis  Once        Question:  Reasons:  Answer:  Patient is Ambulatory    07/19/25 1833     IP VTE HIGH RISK PATIENT  Once         07/19/25 1833     Place sequential compression device  Until discontinued         07/19/25 1833                    Анна Hartman MD  Cardiology  [facility]

## 2025-07-21 NOTE — DISCHARGE SUMMARY
[facility] Medicine  Discharge Summary      Patient Name: Artie Mei  MRN: [id number]  GAYLE: [id number]  Patient Class: OP- Observation  Admission Date: 7/19/2025  Hospital Length of Stay: 0 days  Discharge Date and Time: 07/21/2025 1:22 PM  Attending Physician: Celia Faustin MD   Discharging Provider: Omid Li PA-C  Primary Care Provider: Kp Kwong MD    Primary Care Team: [facility]    HPI:   Patient is a 74-year-old male with a history of HTN, HLD, GERD, Alzheimer's dementia, CAD PCI x7 presented to ED with midsternal chest pain which he reports has been intermittent over the last week.  Denies any radiating chest pain, shortness for breath, nausea, syncope, weakness.  EKG sinus rhythm 93 no ST-elevation, depressions in II, III, AVF, seen in prior EKG from 2023.  Denies any active chest pain.  BNP and troponin both negative , patient will be admitted to Hospital Medicine with a cardiology consult given his cardiac history.    * No surgery found *      Hospital Course:   74-year-old male with history of HTN, HLD, GERD, Alzheimers dementia, and CAD s/p PCI x7 presented with intermittent midsternal chest pain over the past week. No associated shortness of breath, nausea, syncope, or radiation. EKG showed sinus rhythm at 93 bpm with ST depressions in II, III, and aVF, unchanged from prior EKG from 2023. BNP and serial troponins were negative. Patient remained chest pain free during admission.   He was placed in observation for ACS rule out. Cardiology was consulted. Regadenoson stress test with myocardial perfusion SPECT showed no evidence of ischemia or infarction. Ejection fraction was 68% with normal wall motion at rest and post-stress. Fixed inferior wall defect attributed to diaphragmatic attenuation. ECG during the test was negative for ischemia. Rare PVCs were noted and patient reported chest pain during the stress portion.  He remained hemodynamically stable with  no recurrent symptoms or new concerning findings. Patient is medically stable for discharge with outpatient cardiology follow-up.     Goals of Care Treatment Preferences:  Code Status: Full Code         Consults:   Consults (From admission, onward)          Status Ordering Provider     IP consult to case management  Once        Provider:  (Not yet assigned)    Acknowledged MIGUELINA RAYO     Inpatient consult to Cardiology  Once        Provider:  Анна Hartman MD    Completed KERLINE HERRERA            Assessment & Plan  Chest pain  7/20:  Cards consul pending, appreciate recs.     7/19: Tropo negative x3..  PCIx7  No ST elevation. Depression in leads II, III, avf unchanged from previous EKG  CP subsided  HEART score 6    Consult cards  Continue asa daily.  Pt unsure if on plavix at home    HTN (hypertension)  Patient's blood pressure range in the last 24 hours was: BP  Min: 107/69  Max: 139/75.The patient's inpatient anti-hypertensive regimen is listed below:  Current Antihypertensives  , 2 times daily, Oral  hydrALAZINE injection 10 mg, Every 6 hours PRN, Intravenous  amLODIPine tablet 10 mg, Daily, Oral  ranolazine 12 hr tablet 1,000 mg, 2 times daily, Oral  nitroGLYCERIN SL tablet 0.4 mg, Every 5 min PRN, Sublingual  amlodipine (NORVASC) tablet, Daily, Oral    Plan  -Amlodipine prescription updated. Follow-up with PCP.   -   CAD (coronary artery disease)  Patient with known CAD s/p stent placement, which is uncontrolled Will continue ASA and Statin and monitor for S/Sx of angina/ACS. Continue to monitor on telemetry.   Neurodegenerative dementia with anxiety  Patient with dementia with likely etiology of alzheimer's dementia. Dementia is moderate. The patient does not have signs of behavioral disturbance. Home dementia medications are Held or Continued: continued.. Continue non-pharmacologic interventions to prevent delirium (No VS between 11PM-5AM, activity during day, opening blinds, providing  glasses/hearing aids, and up in chair during daytime). Will avoid narcotics and benzos unless absolutely necessary. PRN anti-psychotics are not prescribed to avoid self harm behaviors.  Final Active Diagnoses:    Diagnosis Date Noted POA    PRINCIPAL PROBLEM:  Chest pain [R07.9] 01/13/2023 Yes    Neurodegenerative dementia with anxiety [F03.94] 11/08/2023 Yes    HTN (hypertension) [I10] 01/13/2023 Yes    CAD (coronary artery disease) [I25.10] 01/13/2023 Yes      Problems Resolved During this Admission:       Discharged Condition: stable    Disposition: Home or Self Care    Follow Up:   Follow-up Information       Kp Kwong MD Follow up.    Specialty: Internal Medicine  Contact information:  [facility] [facility] [address] [phone]                           Patient Instructions:      Diet Cardiac     Diet diabetic     Activity as tolerated       Significant Diagnostic Studies: N/A    Pending Diagnostic Studies:       Procedure Component Value Units Date/Time    Echo [[id number]] Resulted: 07/21/25 0955    Order Status: Sent Lab Status: In process Updated: 07/21/25 0955     BSA 1.98 m2      OHS CV CPX PATIENT HEIGHT IN 72     LVOT stroke volume 91.6 cm3      LVIDd 4.6 cm      LV Systolic Volume 40 mL      LV Systolic Volume Index 20.1 mL/m2      LVIDs 3.2 cm      LV Diastolic Volume 97 mL      LV Diastolic Volume Index 48.74 mL/m2      Left Ventricular End Systolic Volume by Teichholz Method 39.65 mL      Left Ventricular End Diastolic Volume by Teichholz Method 97.33 mL      IVS 1.0 cm      LVOT diameter 2.2 cm      LVOT area 3.8 cm2      FS 30.4 %      Left Ventricle Relative Wall Thickness 0.43 cm      PW 1.0 cm      LV mass 158.8 g      LV Mass Index 79.8 g/m2      MV Peak E Dheeraj 0.57 m/s      TDI LATERAL 0.11 m/s      TDI SEPTAL 0.06 m/s      E/E' ratio 7 m/s      MV Peak A Dheeraj 0.92 m/s      TR Max Dheeraj 2.2 m/s      E/A ratio 0.62     IVRT 137 msec      E wave deceleration time 263 msec       LV SEPTAL E/E' RATIO 9.5 m/s      LV LATERAL E/E' RATIO 5.2 m/s      PV Peak S Dheeraj 0.63 m/s      PV Peak D Dheeraj 0.30 m/s      Pulm vein S/D ratio 2.10     LVOT peak dheeraj 1.1 m/s      Left Ventricular Outflow Tract Mean Velocity 0.80 cm/s      Left Ventricular Outflow Tract Mean Gradient 2.76 mmHg      RV- de león basal diam 2.5 cm      RV S' 13.47 cm/s      TAPSE 2.0 cm      RV/LV Ratio 0.54 cm      LA size 3.5 cm      Left Atrium Minor Axis 5.1 cm      Left Atrium Major Axis 5.4 cm      RA Major Axis 5.02 cm      AV mean gradient 3 mmHg      AV peak gradient 6 mmHg      Ao peak dheeraj 1.2 m/s      Ao VTI 20.3 cm      LVOT peak VTI 24.1 cm      AV valve area 4.5 cm²      AV Velocity Ratio 0.92     AV index (prosthetic) 1.19     DICKSON by Velocity Ratio 3.5 cm²      MV stenosis pressure 1/2 time 76.29 ms      MV valve area p 1/2 method 2.88 cm2      TV peak gradient 2 mmHg      Triscuspid Valve Regurgitation Peak Gradient 19 mmHg      PV PEAK VELOCITY 0.84 m/s      PV peak gradient 3 mmHg      Ao root annulus 3.1 cm      Sinus 3.5 cm      ASI 1.8 cm/m2      Aortic Height Index (SOV) 1.9 cm/m      STJ 3.1 cm      Ascending aorta 3.0 cm      ASI 1.5 cm/m2      Aortic Height Index 1.6 cm/m      Mean e' 0.09 m/s      ZLVIDS -0.81     ZLVIDD -2.27     RVDD 2.50 cm      AORTIC VALVE CUSP SEPERATION 2.03 cm      HOMA 29 mL/m2      LA Vol 58 cm3      LA WIDTH 3.7 cm      RA Width 2.9 cm     GREY TOP URINE HOLD [[id number]] Collected: 07/20/25 1926    Order Status: Sent Lab Status: In process Updated: 07/20/25 1932    Specimen: Urine            Medications:  Reconciled Home Medications:      Medication List        START taking these medications      amLODIPine 10 MG tablet  Commonly known as: NORVASC  Take 1 tablet (10 mg total) by mouth once daily.  Start taking on: July 22, 2025     aspirin 81 MG Chew  Take 1 tablet (81 mg total) by mouth once daily.  Start taking on: July 22, 2025            CONTINUE taking these medications       atorvastatin 20 MG tablet  Commonly known as: LIPITOR  Take 20 mg by mouth every evening.     CENTRUM SILVER MEN ORAL  Take by mouth.     donepeziL 5 MG tablet  Commonly known as: ARICEPT  Take 5 mg by mouth every evening.     ferrous sulfate Tab tablet  Commonly known as: FEOSOL  Take 1 tablet by mouth once daily.     FLUoxetine 20 MG capsule  Take 20 mg by mouth once daily.     nitroGLYCERIN 0.4 MG SL tablet  Commonly known as: NITROSTAT  Place 1 tablet (0.4 mg total) under the tongue every 5 (five) minutes as needed for Chest pain. Place 1 under tongue if you have Chest Pain every 5 minutes until pain resolves; if you take 3 and pain has not resolved, please come to the Emergency Room.     OZEMPIC 0.25 mg or 0.5 mg (2 mg/3 mL) pen injector  Generic drug: semaglutide  Inject 0.25 mg into the skin every 7 days.     ranolazine 1,000 mg Tb12  Commonly known as: RANEXA  Take 1,000 mg by mouth 2 (two) times daily.     STOOL SOFTENER ORAL  Take by mouth every evening.     traZODone 50 MG tablet  Commonly known as: DESYREL  Take 100 mg by mouth every evening.     VITAMIN B-12 50 mcg tablet  Generic drug: cyanocobalamin (vitamin B-12)  Take 50 mcg by mouth once daily.            STOP taking these medications      amlodipine-benazepril 10-20mg 10-20 mg per capsule  Commonly known as: LOTREL              Indwelling Lines/Drains at time of discharge:   Lines/Drains/Airways       None                       Time spent on the discharge of patient: 35 minutes         Omid Li PA-C  Department of Hospital Medicine  [facility] [facility]

## 2025-07-21 NOTE — ASSESSMENT & PLAN NOTE
Patient's blood pressure range in the last 24 hours was: BP  Min: 107/69  Max: 139/75.The patient's inpatient anti-hypertensive regimen is listed below:  Current Antihypertensives  , 2 times daily, Oral  hydrALAZINE injection 10 mg, Every 6 hours PRN, Intravenous  amLODIPine tablet 10 mg, Daily, Oral  ranolazine 12 hr tablet 1,000 mg, 2 times daily, Oral  nitroGLYCERIN SL tablet 0.4 mg, Every 5 min PRN, Sublingual  amlodipine (NORVASC) tablet, Daily, Oral    Plan  -Amlodipine prescription updated. Follow-up with PCP.   -

## 2025-07-21 NOTE — SUBJECTIVE & OBJECTIVE
Interval History:   Chest pain-free overnight.    Review of Systems   Constitutional: Negative.   HENT: Negative.     Eyes: Negative.    Respiratory: Negative.     Endocrine: Negative.    Hematologic/Lymphatic: Negative.    Skin: Negative.    Musculoskeletal: Negative.    Gastrointestinal: Negative.    Genitourinary: Negative.    Neurological: Negative.    Psychiatric/Behavioral: Negative.     Allergic/Immunologic: Negative.      Objective:     Vital Signs (Most Recent):  Temp: 97.5 °F (36.4 °C) (07/21/25 1113)  Pulse: 75 (07/21/25 1128)  Resp: 18 (07/21/25 1113)  BP: 107/69 (07/21/25 1113)  SpO2: 100 % (07/21/25 1113) Vital Signs (24h Range):  Temp:  [97.5 °F (36.4 °C)-98.2 °F (36.8 °C)] 97.5 °F (36.4 °C)  Pulse:  [67-89] 75  Resp:  [18] 18  SpO2:  [98 %-100 %] 100 %  BP: (107-139)/(68-78) 107/69     Weight: 77.6 kg (171 lb)  Body mass index is 23.19 kg/m².     SpO2: 100 %       No intake or output data in the 24 hours ending 07/21/25 1407    Lines/Drains/Airways       Peripheral Intravenous Line  Duration             Peripheral IV 07/19/25 1705 20 G Right Antecubital 1 day                       Physical Exam  Vitals reviewed.   Constitutional:       Appearance: He is well-developed.   HENT:      Head: Normocephalic.   Eyes:      Conjunctiva/sclera: Conjunctivae normal.      Pupils: Pupils are equal, round, and reactive to light.   Cardiovascular:      Rate and Rhythm: Normal rate and regular rhythm.      Heart sounds: Normal heart sounds.   Pulmonary:      Effort: Pulmonary effort is normal.      Breath sounds: Normal breath sounds.   Abdominal:      General: Bowel sounds are normal.      Palpations: Abdomen is soft.   Musculoskeletal:      Cervical back: Normal range of motion and neck supple.   Skin:     General: Skin is warm.   Neurological:      Mental Status: He is alert and oriented to person, place, and time.            Significant Labs:     Laboratory:  CBC:  Recent Labs   Lab 07/19/25  1639 07/20/25  0428  07/21/25  0413   WBC 6.36 6.75 7.24   HGB 11.9 L 11.3 L 11.8 L   HCT 33.9 L 32.3 L 33.4 L   Platelet Count 215 220 211       CHEMISTRIES:  Recent Labs   Lab 07/20/23  1023 09/27/24  1555 07/19/25  1639 07/20/25 0428 07/21/25 0413   Glucose 130 H  --  173 H 119 H 102   Sodium 139   < > 140 142 139   Potassium 4.0   < > 3.9 3.9 3.9   BUN 15   < > 17 16 18   Creatinine 1.3   < > 1.4 1.3 1.3   Calcium 9.7   < > 8.8 8.8 9.0   Magnesium   --   --  2.0  --   --    Magnesium 1.7  --   --   --   --     < > = values in this interval not displayed.       CARDIAC BIOMARKERS:  Recent Labs   Lab 09/27/24  1555 07/19/25  1639 07/19/25 1939 07/19/25 2036   CK 85  --   --   --    Troponin-I  --  0.006 <0.006 <0.006       COAGS:  Recent Labs   Lab 09/27/24  1555   INR 1.0       LIPIDS/LFTS:  Recent Labs   Lab 01/13/23 2025 07/20/23  1023 07/19/25  1639 07/20/25  0428 07/21/25  0413   Cholesterol 167  --   --   --   --    Triglycerides 54  --   --   --   --    HDL 76 H  --   --   --   --    LDL Cholesterol 80.2  --   --   --   --    Non-HDL Cholesterol 91  --   --   --   --    AST  --    < > 24 20 19   ALT  --    < > 28 22 21    < > = values in this interval not displayed.       Hemoglobin A1C   Date Value Ref Range Status   01/13/2023 6.1 (H) 4.0 - 5.6 % Final     Comment:     ADA Screening Guidelines:  5.7-6.4%  Consistent with prediabetes  >or=6.5%  Consistent with diabetes    High levels of fetal hemoglobin interfere with the HbA1C  assay. Heterozygous hemoglobin variants (HbS, HgC, etc)do  not significantly interfere with this assay.   However, presence of multiple variants may affect accuracy.         TSH  Recent Labs   Lab 01/13/23 2025 07/20/23  1023   TSH 0.469 0.794       The 10-year ASCVD risk score (Anselmo CASTRO, et al., 2019) is: 12.9%    Values used to calculate the score:      Age: 74 years      Sex: Male      Is Non- : Yes      Diabetic: No      Tobacco smoker: No      Systolic Blood  Pressure: 107 mmHg      Is BP treated: Yes      HDL Cholesterol: 76 mg/dL      Total Cholesterol: 167 mg/dL       BNP    Lab Results   Component Value Date/Time    BNP 28 07/19/2025 04:39 PM    BNP 23 01/13/2023 05:43 PM            ECHO    Results for orders placed during the hospital encounter of 07/19/25    Echo    Interpretation Summary    Left Ventricle: The left ventricle is normal in size. Normal wall thickness. There is normal systolic function with a visually estimated ejection fraction of 60 - 65%. Grade I diastolic dysfunction.    Right Ventricle: The right ventricle is normal in size measuring 2.5 cm. Systolic function is normal.    Left Atrium: The left atrium is moderately dialted.    Right Atrium: The right atrium is moderately dilated .    Pulmonary Artery: The estimated pulmonary artery systolic pressure is 22 mmHg.    IVC/SVC: Normal venous pressure at 3 mmHg.      STRESS TEST    Results for orders placed during the hospital encounter of 07/19/25    Nuclear Stress - Cardiology Interpreted    Interpretation Summary    Normal myocardial perfusion scan. There is no evidence of myocardial ischemia or infarction.    There is a mild to moderate intensity fixed perfusion abnormality in the inferior wall of the left ventricle secondary to diaphragm attenuation.    The gated perfusion images showed an ejection fraction of 68% at rest.    There is normal wall motion at rest and post-stress.    The ECG portion of the study is negative for ischemia.    The patient reported chest pain during the stress test.    During stress, rare PVCs are noted.        CATH    No results found for this or any previous visit.            Medications Discontinued During This Encounter   Medication Reason    magnesium sulfate in dextrose IVPB (premix) 1 g     memantine (NAMENDA) 5 MG Tab Dose adjustment    levocetirizine (XYZAL) 5 MG tablet Patient no longer taking    memantine (NAMENDA) 10 MG Tab Patient no longer taking     ranolazine (RANEXA) 500 MG Tb12 Dose adjustment    chlorproMAZINE (THORAZINE) 25 MG tablet Patient no longer taking    carvediloL (COREG) 12.5 MG tablet Patient no longer taking    omeprazole (PRILOSEC) 20 MG capsule Patient no longer taking    amlodipine-benazepril 10-20mg (LOTREL) 10-20 mg per capsule Stop Taking at Discharge         DATA:

## 2025-07-21 NOTE — PLAN OF CARE
Case Management Final Discharge Note    Discharge Disposition: home    New DME ordered / company name: none    Relevant SDOH / Transition of Care Barriers:  n/a    Person available to provide assistance at home when needed and their contact information: famil    Scheduled followup appointment: Voice message left for PCP advising of patient's need for a hospital f/u appt.  Cardiology  8/25/2025-added to the Wait List    Referrals placed: Cardiology    Transportation: patient.        Patient and family educated on discharge services and updated on DC plan. Bedside RN notified, patient clear to discharge from Case Management Perspective.      07/21/25 1518   Final Note   Assessment Type Final Discharge Note   Anticipated Discharge Disposition Home   What phone number can be called within the next 1-3 days to see how you are doing after discharge? [phone]   Hospital Resources/Appts/Education Provided Appointments scheduled and added to AVS   Post-Acute Status   Discharge Delays None known at this time

## 2025-07-24 ENCOUNTER — PATIENT OUTREACH (OUTPATIENT)
Dept: ADMINISTRATIVE | Facility: CLINIC | Age: 75
End: 2025-07-24
Payer: MEDICARE

## 2025-07-24 ENCOUNTER — PATIENT MESSAGE (OUTPATIENT)
Dept: ADMINISTRATIVE | Facility: CLINIC | Age: 75
End: 2025-07-24
Payer: MEDICARE

## 2025-07-24 NOTE — PROGRESS NOTES
C3 nurse spoke with Artiepriyanka Spauldingautumn who is unable to complete the call at this time and requested a callback. Patient does not have a HOSFU with his PCP, Kp Kwong MD withinin the first week post DC date of 7/21/25. No messages routed at this time.

## 2025-07-25 NOTE — PROGRESS NOTES
C3 nurse attempted to contact Artie Mei for a TCC post hospital discharge follow up call. No answer. LVM requesting a callback at [phone]. Patient does not have a HOSFU with his PCP, Kp Kwong MD withinin the first week post DC date of 7/21/25. Message routed to Kp Kwong MD requesting appt assistance. Unable to route to PCP staff.